# Patient Record
Sex: MALE | Race: WHITE | NOT HISPANIC OR LATINO | URBAN - METROPOLITAN AREA
[De-identification: names, ages, dates, MRNs, and addresses within clinical notes are randomized per-mention and may not be internally consistent; named-entity substitution may affect disease eponyms.]

---

## 2020-11-30 ENCOUNTER — INPATIENT (INPATIENT)
Facility: HOSPITAL | Age: 50
LOS: 4 days | Discharge: HOME | End: 2020-12-05
Payer: COMMERCIAL

## 2020-11-30 VITALS
DIASTOLIC BLOOD PRESSURE: 69 MMHG | HEIGHT: 68 IN | RESPIRATION RATE: 20 BRPM | OXYGEN SATURATION: 95 % | TEMPERATURE: 103 F | WEIGHT: 259.93 LBS | SYSTOLIC BLOOD PRESSURE: 130 MMHG

## 2020-11-30 DIAGNOSIS — Z98.890 OTHER SPECIFIED POSTPROCEDURAL STATES: Chronic | ICD-10-CM

## 2020-11-30 DIAGNOSIS — E11.9 TYPE 2 DIABETES MELLITUS WITHOUT COMPLICATIONS: ICD-10-CM

## 2020-11-30 DIAGNOSIS — N39.0 URINARY TRACT INFECTION, SITE NOT SPECIFIED: ICD-10-CM

## 2020-11-30 DIAGNOSIS — I10 ESSENTIAL (PRIMARY) HYPERTENSION: ICD-10-CM

## 2020-11-30 LAB
ALBUMIN SERPL ELPH-MCNC: 4.6 G/DL — SIGNIFICANT CHANGE UP (ref 3.5–5.2)
ALP SERPL-CCNC: 74 U/L — SIGNIFICANT CHANGE UP (ref 30–115)
ALT FLD-CCNC: 46 U/L — HIGH (ref 0–41)
ANION GAP SERPL CALC-SCNC: 16 MMOL/L — HIGH (ref 7–14)
APPEARANCE UR: CLEAR — SIGNIFICANT CHANGE UP
APTT BLD: 31.7 SEC — SIGNIFICANT CHANGE UP (ref 27–39.2)
AST SERPL-CCNC: 24 U/L — SIGNIFICANT CHANGE UP (ref 0–41)
BACTERIA # UR AUTO: ABNORMAL
BASE EXCESS BLDV CALC-SCNC: -2 MMOL/L — SIGNIFICANT CHANGE UP (ref -2–2)
BASOPHILS # BLD AUTO: 0.04 K/UL — SIGNIFICANT CHANGE UP (ref 0–0.2)
BASOPHILS NFR BLD AUTO: 0.2 % — SIGNIFICANT CHANGE UP (ref 0–1)
BILIRUB SERPL-MCNC: 1.7 MG/DL — HIGH (ref 0.2–1.2)
BILIRUB UR-MCNC: ABNORMAL
BUN SERPL-MCNC: 21 MG/DL — HIGH (ref 10–20)
CA-I SERPL-SCNC: 1.22 MMOL/L — SIGNIFICANT CHANGE UP (ref 1.12–1.3)
CALCIUM SERPL-MCNC: 9.5 MG/DL — SIGNIFICANT CHANGE UP (ref 8.5–10.1)
CHLORIDE SERPL-SCNC: 100 MMOL/L — SIGNIFICANT CHANGE UP (ref 98–110)
CO2 SERPL-SCNC: 19 MMOL/L — SIGNIFICANT CHANGE UP (ref 17–32)
COLOR SPEC: YELLOW — SIGNIFICANT CHANGE UP
CREAT SERPL-MCNC: 1.3 MG/DL — SIGNIFICANT CHANGE UP (ref 0.7–1.5)
DIFF PNL FLD: ABNORMAL
EOSINOPHIL # BLD AUTO: 0 K/UL — SIGNIFICANT CHANGE UP (ref 0–0.7)
EOSINOPHIL NFR BLD AUTO: 0 % — SIGNIFICANT CHANGE UP (ref 0–8)
EPI CELLS # UR: ABNORMAL /HPF
GAS PNL BLDV: 139 MMOL/L — SIGNIFICANT CHANGE UP (ref 136–145)
GAS PNL BLDV: SIGNIFICANT CHANGE UP
GLUCOSE BLDC GLUCOMTR-MCNC: 221 MG/DL — HIGH (ref 70–99)
GLUCOSE SERPL-MCNC: 185 MG/DL — HIGH (ref 70–99)
GLUCOSE UR QL: NEGATIVE MG/DL — SIGNIFICANT CHANGE UP
HCO3 BLDV-SCNC: 22 MMOL/L — SIGNIFICANT CHANGE UP (ref 22–29)
HCT VFR BLD CALC: 41.2 % — LOW (ref 42–52)
HCT VFR BLDA CALC: 37.9 % — SIGNIFICANT CHANGE UP (ref 34–44)
HGB BLD CALC-MCNC: 12.4 G/DL — LOW (ref 14–18)
HGB BLD-MCNC: 14.4 G/DL — SIGNIFICANT CHANGE UP (ref 14–18)
HOROWITZ INDEX BLDV+IHG-RTO: 21 — SIGNIFICANT CHANGE UP
IMM GRANULOCYTES NFR BLD AUTO: 0.8 % — HIGH (ref 0.1–0.3)
INR BLD: 1.34 RATIO — HIGH (ref 0.65–1.3)
KETONES UR-MCNC: NEGATIVE — SIGNIFICANT CHANGE UP
LACTATE BLDV-MCNC: 2 MMOL/L — HIGH (ref 0.5–1.6)
LEUKOCYTE ESTERASE UR-ACNC: ABNORMAL
LYMPHOCYTES # BLD AUTO: 0.38 K/UL — LOW (ref 1.2–3.4)
LYMPHOCYTES # BLD AUTO: 1.7 % — LOW (ref 20.5–51.1)
MANUAL SMEAR VERIFICATION: SIGNIFICANT CHANGE UP
MCHC RBC-ENTMCNC: 30.8 PG — SIGNIFICANT CHANGE UP (ref 27–31)
MCHC RBC-ENTMCNC: 35 G/DL — SIGNIFICANT CHANGE UP (ref 32–37)
MCV RBC AUTO: 88 FL — SIGNIFICANT CHANGE UP (ref 80–94)
MONOCYTES # BLD AUTO: 1.5 K/UL — HIGH (ref 0.1–0.6)
MONOCYTES NFR BLD AUTO: 6.6 % — SIGNIFICANT CHANGE UP (ref 1.7–9.3)
NEUTROPHILS # BLD AUTO: 20.78 K/UL — HIGH (ref 1.4–6.5)
NEUTROPHILS NFR BLD AUTO: 90.7 % — HIGH (ref 42.2–75.2)
NEUTS BAND # BLD: 2 % — SIGNIFICANT CHANGE UP (ref 0–6)
NITRITE UR-MCNC: NEGATIVE — SIGNIFICANT CHANGE UP
NRBC # BLD: 0 /100 WBCS — SIGNIFICANT CHANGE UP (ref 0–0)
NRBC # BLD: 0 /100 — SIGNIFICANT CHANGE UP (ref 0–0)
PCO2 BLDV: 34 MMHG — LOW (ref 41–51)
PH BLDV: 7.42 — SIGNIFICANT CHANGE UP (ref 7.26–7.43)
PH UR: 6 — SIGNIFICANT CHANGE UP (ref 5–8)
PLAT MORPH BLD: NORMAL — SIGNIFICANT CHANGE UP
PLATELET # BLD AUTO: 150 K/UL — SIGNIFICANT CHANGE UP (ref 130–400)
PO2 BLDV: 40 MMHG — SIGNIFICANT CHANGE UP (ref 20–40)
POTASSIUM BLDV-SCNC: 3.7 MMOL/L — SIGNIFICANT CHANGE UP (ref 3.3–5.6)
POTASSIUM SERPL-MCNC: 4 MMOL/L — SIGNIFICANT CHANGE UP (ref 3.5–5)
POTASSIUM SERPL-SCNC: 4 MMOL/L — SIGNIFICANT CHANGE UP (ref 3.5–5)
PROT SERPL-MCNC: 7.7 G/DL — SIGNIFICANT CHANGE UP (ref 6–8)
PROT UR-MCNC: 100 MG/DL
PROTHROM AB SERPL-ACNC: 15.4 SEC — HIGH (ref 9.95–12.87)
RAPID RVP RESULT: SIGNIFICANT CHANGE UP
RBC # BLD: 4.68 M/UL — LOW (ref 4.7–6.1)
RBC # FLD: 12.1 % — SIGNIFICANT CHANGE UP (ref 11.5–14.5)
RBC BLD AUTO: NORMAL — SIGNIFICANT CHANGE UP
RBC CASTS # UR COMP ASSIST: ABNORMAL /HPF
SAO2 % BLDV: 78 % — SIGNIFICANT CHANGE UP
SARS-COV-2 RNA SPEC QL NAA+PROBE: SIGNIFICANT CHANGE UP
SODIUM SERPL-SCNC: 135 MMOL/L — SIGNIFICANT CHANGE UP (ref 135–146)
SP GR SPEC: 1.02 — SIGNIFICANT CHANGE UP (ref 1.01–1.03)
UROBILINOGEN FLD QL: 0.2 MG/DL — SIGNIFICANT CHANGE UP (ref 0.2–0.2)
WBC # BLD: 22.89 K/UL — HIGH (ref 4.8–10.8)
WBC # FLD AUTO: 22.89 K/UL — HIGH (ref 4.8–10.8)
WBC UR QL: >50 /HPF

## 2020-11-30 PROCEDURE — 99285 EMERGENCY DEPT VISIT HI MDM: CPT

## 2020-11-30 PROCEDURE — 71045 X-RAY EXAM CHEST 1 VIEW: CPT | Mod: 26

## 2020-11-30 RX ORDER — SODIUM CHLORIDE 9 MG/ML
3700 INJECTION, SOLUTION INTRAVENOUS ONCE
Refills: 0 | Status: COMPLETED | OUTPATIENT
Start: 2020-11-30 | End: 2020-11-30

## 2020-11-30 RX ORDER — IBUPROFEN 200 MG
400 TABLET ORAL EVERY 8 HOURS
Refills: 0 | Status: DISCONTINUED | OUTPATIENT
Start: 2020-11-30 | End: 2020-12-05

## 2020-11-30 RX ORDER — ENOXAPARIN SODIUM 100 MG/ML
40 INJECTION SUBCUTANEOUS DAILY
Refills: 0 | Status: DISCONTINUED | OUTPATIENT
Start: 2020-11-30 | End: 2020-12-05

## 2020-11-30 RX ORDER — INFLUENZA VIRUS VACCINE 15; 15; 15; 15 UG/.5ML; UG/.5ML; UG/.5ML; UG/.5ML
0.5 SUSPENSION INTRAMUSCULAR ONCE
Refills: 0 | Status: COMPLETED | OUTPATIENT
Start: 2020-11-30 | End: 2020-12-05

## 2020-11-30 RX ORDER — ACETAMINOPHEN 500 MG
975 TABLET ORAL ONCE
Refills: 0 | Status: COMPLETED | OUTPATIENT
Start: 2020-11-30 | End: 2020-11-30

## 2020-11-30 RX ORDER — CEFTRIAXONE 500 MG/1
1000 INJECTION, POWDER, FOR SOLUTION INTRAMUSCULAR; INTRAVENOUS ONCE
Refills: 0 | Status: COMPLETED | OUTPATIENT
Start: 2020-11-30 | End: 2020-11-30

## 2020-11-30 RX ORDER — ACETAMINOPHEN 500 MG
650 TABLET ORAL EVERY 6 HOURS
Refills: 0 | Status: DISCONTINUED | OUTPATIENT
Start: 2020-11-30 | End: 2020-12-05

## 2020-11-30 RX ORDER — METFORMIN HYDROCHLORIDE 850 MG/1
500 TABLET ORAL DAILY
Refills: 0 | Status: DISCONTINUED | OUTPATIENT
Start: 2020-11-30 | End: 2020-11-30

## 2020-11-30 RX ORDER — LOSARTAN POTASSIUM 100 MG/1
0 TABLET, FILM COATED ORAL
Qty: 0 | Refills: 0 | DISCHARGE

## 2020-11-30 RX ORDER — METFORMIN HYDROCHLORIDE 850 MG/1
1 TABLET ORAL
Qty: 0 | Refills: 0 | DISCHARGE

## 2020-11-30 RX ORDER — LOSARTAN POTASSIUM 100 MG/1
100 TABLET, FILM COATED ORAL DAILY
Refills: 0 | Status: DISCONTINUED | OUTPATIENT
Start: 2020-11-30 | End: 2020-12-05

## 2020-11-30 RX ORDER — SODIUM CHLORIDE 9 MG/ML
1000 INJECTION INTRAMUSCULAR; INTRAVENOUS; SUBCUTANEOUS ONCE
Refills: 0 | Status: COMPLETED | OUTPATIENT
Start: 2020-11-30 | End: 2020-11-30

## 2020-11-30 RX ORDER — CEFEPIME 1 G/1
500 INJECTION, POWDER, FOR SOLUTION INTRAMUSCULAR; INTRAVENOUS EVERY 12 HOURS
Refills: 0 | Status: DISCONTINUED | OUTPATIENT
Start: 2020-11-30 | End: 2020-12-01

## 2020-11-30 RX ADMIN — SODIUM CHLORIDE 3700 MILLILITER(S): 9 INJECTION, SOLUTION INTRAVENOUS at 11:23

## 2020-11-30 RX ADMIN — Medication 650 MILLIGRAM(S): at 17:50

## 2020-11-30 RX ADMIN — Medication 650 MILLIGRAM(S): at 16:17

## 2020-11-30 RX ADMIN — Medication 650 MILLIGRAM(S): at 22:33

## 2020-11-30 RX ADMIN — Medication 975 MILLIGRAM(S): at 11:22

## 2020-11-30 RX ADMIN — SODIUM CHLORIDE 1000 MILLILITER(S): 9 INJECTION INTRAMUSCULAR; INTRAVENOUS; SUBCUTANEOUS at 18:14

## 2020-11-30 RX ADMIN — CEFTRIAXONE 100 MILLIGRAM(S): 500 INJECTION, POWDER, FOR SOLUTION INTRAMUSCULAR; INTRAVENOUS at 12:25

## 2020-11-30 RX ADMIN — Medication 400 MILLIGRAM(S): at 18:33

## 2020-11-30 RX ADMIN — Medication 400 MILLIGRAM(S): at 19:56

## 2020-11-30 RX ADMIN — CEFEPIME 100 MILLIGRAM(S): 1 INJECTION, POWDER, FOR SOLUTION INTRAMUSCULAR; INTRAVENOUS at 17:49

## 2020-11-30 NOTE — H&P ADULT - NSHPREVIEWOFSYSTEMS_GEN_ALL_CORE
REVIEW OF SYSTEMS:    CONSTITUTIONAL: see HPI  EYES/ENT: No visual changes;  No vertigo or throat pain   NECK: No pain or stiffness  RESPIRATORY: No cough, wheezing, hemoptysis; No shortness of breath  CARDIOVASCULAR: No chest pain or palpitations  GASTROINTESTINAL: No abdominal or epigastric pain. No nausea, vomiting, or hematemesis; No diarrhea or constipation. No melena or hematochezia.  GENITOURINARY: see HPI  NEUROLOGICAL: No numbness or weakness  SKIN: No itching, rashes

## 2020-11-30 NOTE — ED PROVIDER NOTE - PROGRESS NOTE DETAILS
AG: d/w urologist Dr Maurice [phone] who suggests additional w/u incl covid as pt has been on bactrim, neg CT 2wks ago.

## 2020-11-30 NOTE — ED PROVIDER NOTE - OBJECTIVE STATEMENT
50yM pmhx HTN, pre-diabetes c/o fever tmax 101 x1 day; constant, waxing and waning w/ tylenol; associated w/ 2wks of urinary frequency, cloudy urine, and suprapubic abd pain, took 1 course of bactrim at start of sx and has taken 3 doses of a second course, both rx'ed by pt's urologist Dr Maurice who he sees because of frequent UTIs; had -CT abd/pel 2wks ago. States he had mid-back discomfort a few days ago, denies dysuria.

## 2020-11-30 NOTE — ED ADULT TRIAGE NOTE - CHIEF COMPLAINT QUOTE
Pt states he has been running a fever since last night.  States he is on batrium x 2 weeks. Pt also stopped taking Metformin a week ago

## 2020-11-30 NOTE — ED PROVIDER NOTE - PHYSICAL EXAMINATION
Vital Signs: Reviewed  GEN: alert, NAD, speaks full sentences, answers questions appropriately  HEAD:  normocephalic, atraumatic  EYES:  PERRLA; conjunctivae without injection, drainage or discharge  ENMT:  nasal mucosa moist; mouth moist without ulcerations or lesions; throat moist without erythema, exudate, ulcerations or lesions  NECK:  supple  CARDIAC:  regular rate, normal S1 and S2, no murmurs  RESP:  respiratory rate and effort appear normal for age; lungs are clear to auscultation bilaterally; no rales or wheezes  ABDOMEN:  mild suprapubic tenderness no guarding no rebound; soft, nondistended  : no penile discharge, no CVA tenderness  MUSCULOSKELETAL/NEURO:  normal movement, normal tone  SKIN:  normal skin color for age and race, well-perfused; warm and dry

## 2020-11-30 NOTE — H&P ADULT - NSHPLABSRESULTS_GEN_ALL_CORE
14.4   22.89 )-----------( 150      ( 2020 11:12 )             41.2           135  |  100  |  21<H>  ----------------------------<  185<H>  4.0   |  19  |  1.3    Ca    9.5      2020 11:12    TPro  7.7  /  Alb  4.6  /  TBili  1.7<H>  /  DBili  x   /  AST  24  /  ALT  46<H>  /  AlkPhos  74                Urinalysis Basic - ( 2020 11:52 )    Color: Yellow / Appearance: Clear / S.025 / pH: x  Gluc: x / Ketone: Negative  / Bili: Small / Urobili: 0.2 mg/dL   Blood: x / Protein: 100 mg/dL / Nitrite: Negative   Leuk Esterase: Moderate / RBC: 6-10 /HPF / WBC >50 /HPF   Sq Epi: x / Non Sq Epi: Occasional /HPF / Bacteria: Many        PT/INR - ( 2020 11:12 )   PT: 15.40 sec;   INR: 1.34 ratio         PTT - ( 2020 11:12 )  PTT:31.7 sec    Lactate Trend            < from: Xray Chest 1 View-PORTABLE IMMEDIATE (20 @ 11:10) >    Impression:    No radiographic evidence of acute cardiopulmonary disease.        PARAM LEUNG MD; Attending Radiologist  This document has been electronically signed. 2020 11:14AM

## 2020-11-30 NOTE — H&P ADULT - NSHPPHYSICALEXAM_GEN_ALL_CORE
GENERAL:  51y/o Male NAD, resting comfortably.  HEAD:  Atraumatic, Normocephalic  EYES: EOMI, PERRLA, conjunctiva and sclera clear  NECK: Supple, No JVD, no cervical lymphadenopathy, non-tender  CHEST/LUNG: Clear to auscultation bilaterally; No wheeze, rhonchi, or rales  HEART: Regular rate and rhythm; S1&S2  ABDOMEN: Soft, Nontender, Nondistended x 4 quadrants; Bowel sounds present  EXTREMITIES:   Peripheral Pulses Present, No clubbing, no cyanosis, or no edema, no calf tenderness  PSYCH: AAOx3, cooperative, appropriate  NEUROLOGY: WNL  SKIN: WNL

## 2020-11-30 NOTE — ED PROVIDER NOTE - CLINICAL SUMMARY MEDICAL DECISION MAKING FREE TEXT BOX
needs IV abx, IV fluid.  In my opinion, in patient treatment is medically justifiable and appropriate.

## 2020-11-30 NOTE — ED PROVIDER NOTE - NS ED ROS FT
Review of Systems:  	•	CONSTITUTIONAL - no fever, no diaphoresis  	•	SKIN - no rash, no lesions  	•	HEMATOLOGIC - no bleeding, no bruising  	•	EYES - no discharge, no injection  	•	ENT - no sore throat, no runny nose  	•	RESPIRATORY - no shortness of breath, no cough  	•	CARDIAC - no chest pain, no palpitations  	•	GI - +abd pain, no nausea, no vomiting, no diarrhea  	•	GENITO-URINARY - no dysuria, no hematuria, +urinary frequency, +cloudy urine  	•	MUSCULOSKELETAL - no joint pain, no muscle aches  	•	NEUROLOGIC - no dizziness, no headache

## 2020-12-01 LAB
ANION GAP SERPL CALC-SCNC: 12 MMOL/L — SIGNIFICANT CHANGE UP (ref 7–14)
BASOPHILS # BLD AUTO: 0.03 K/UL — SIGNIFICANT CHANGE UP (ref 0–0.2)
BASOPHILS NFR BLD AUTO: 0.1 % — SIGNIFICANT CHANGE UP (ref 0–1)
BUN SERPL-MCNC: 13 MG/DL — SIGNIFICANT CHANGE UP (ref 10–20)
CALCIUM SERPL-MCNC: 9.2 MG/DL — SIGNIFICANT CHANGE UP (ref 8.5–10.1)
CHLORIDE SERPL-SCNC: 103 MMOL/L — SIGNIFICANT CHANGE UP (ref 98–110)
CO2 SERPL-SCNC: 22 MMOL/L — SIGNIFICANT CHANGE UP (ref 17–32)
CREAT SERPL-MCNC: 1 MG/DL — SIGNIFICANT CHANGE UP (ref 0.7–1.5)
EOSINOPHIL # BLD AUTO: 0 K/UL — SIGNIFICANT CHANGE UP (ref 0–0.7)
EOSINOPHIL NFR BLD AUTO: 0 % — SIGNIFICANT CHANGE UP (ref 0–8)
GLUCOSE BLDC GLUCOMTR-MCNC: 130 MG/DL — HIGH (ref 70–99)
GLUCOSE BLDC GLUCOMTR-MCNC: 149 MG/DL — HIGH (ref 70–99)
GLUCOSE BLDC GLUCOMTR-MCNC: 156 MG/DL — HIGH (ref 70–99)
GLUCOSE BLDC GLUCOMTR-MCNC: 157 MG/DL — HIGH (ref 70–99)
GLUCOSE BLDC GLUCOMTR-MCNC: 182 MG/DL — HIGH (ref 70–99)
GLUCOSE SERPL-MCNC: 156 MG/DL — HIGH (ref 70–99)
HCT VFR BLD CALC: 37.2 % — LOW (ref 42–52)
HGB BLD-MCNC: 12.7 G/DL — LOW (ref 14–18)
IMM GRANULOCYTES NFR BLD AUTO: 1.2 % — HIGH (ref 0.1–0.3)
LYMPHOCYTES # BLD AUTO: 0.73 K/UL — LOW (ref 1.2–3.4)
LYMPHOCYTES # BLD AUTO: 3.6 % — LOW (ref 20.5–51.1)
MCHC RBC-ENTMCNC: 30.5 PG — SIGNIFICANT CHANGE UP (ref 27–31)
MCHC RBC-ENTMCNC: 34.1 G/DL — SIGNIFICANT CHANGE UP (ref 32–37)
MCV RBC AUTO: 89.4 FL — SIGNIFICANT CHANGE UP (ref 80–94)
MONOCYTES # BLD AUTO: 1.68 K/UL — HIGH (ref 0.1–0.6)
MONOCYTES NFR BLD AUTO: 8.3 % — SIGNIFICANT CHANGE UP (ref 1.7–9.3)
NEUTROPHILS # BLD AUTO: 17.55 K/UL — HIGH (ref 1.4–6.5)
NEUTROPHILS NFR BLD AUTO: 86.8 % — HIGH (ref 42.2–75.2)
NRBC # BLD: 0 /100 WBCS — SIGNIFICANT CHANGE UP (ref 0–0)
PLATELET # BLD AUTO: 129 K/UL — LOW (ref 130–400)
POTASSIUM SERPL-MCNC: 4.5 MMOL/L — SIGNIFICANT CHANGE UP (ref 3.5–5)
POTASSIUM SERPL-SCNC: 4.5 MMOL/L — SIGNIFICANT CHANGE UP (ref 3.5–5)
RBC # BLD: 4.16 M/UL — LOW (ref 4.7–6.1)
RBC # FLD: 12.6 % — SIGNIFICANT CHANGE UP (ref 11.5–14.5)
SODIUM SERPL-SCNC: 137 MMOL/L — SIGNIFICANT CHANGE UP (ref 135–146)
WBC # BLD: 20.24 K/UL — HIGH (ref 4.8–10.8)
WBC # FLD AUTO: 20.24 K/UL — HIGH (ref 4.8–10.8)

## 2020-12-01 PROCEDURE — 74176 CT ABD & PELVIS W/O CONTRAST: CPT | Mod: 26

## 2020-12-01 RX ORDER — MEROPENEM 1 G/30ML
1000 INJECTION INTRAVENOUS EVERY 8 HOURS
Refills: 0 | Status: DISCONTINUED | OUTPATIENT
Start: 2020-12-01 | End: 2020-12-04

## 2020-12-01 RX ORDER — KETOROLAC TROMETHAMINE 30 MG/ML
30 SYRINGE (ML) INJECTION ONCE
Refills: 0 | Status: DISCONTINUED | OUTPATIENT
Start: 2020-12-01 | End: 2020-12-01

## 2020-12-01 RX ORDER — MEROPENEM 1 G/30ML
1000 INJECTION INTRAVENOUS ONCE
Refills: 0 | Status: COMPLETED | OUTPATIENT
Start: 2020-12-01 | End: 2020-12-01

## 2020-12-01 RX ORDER — SODIUM CHLORIDE 9 MG/ML
500 INJECTION INTRAMUSCULAR; INTRAVENOUS; SUBCUTANEOUS ONCE
Refills: 0 | Status: COMPLETED | OUTPATIENT
Start: 2020-12-01 | End: 2020-12-01

## 2020-12-01 RX ORDER — PANTOPRAZOLE SODIUM 20 MG/1
40 TABLET, DELAYED RELEASE ORAL
Refills: 0 | Status: DISCONTINUED | OUTPATIENT
Start: 2020-12-01 | End: 2020-12-05

## 2020-12-01 RX ORDER — SODIUM CHLORIDE 9 MG/ML
1000 INJECTION, SOLUTION INTRAVENOUS
Refills: 0 | Status: DISCONTINUED | OUTPATIENT
Start: 2020-12-01 | End: 2020-12-02

## 2020-12-01 RX ORDER — MEROPENEM 1 G/30ML
INJECTION INTRAVENOUS
Refills: 0 | Status: DISCONTINUED | OUTPATIENT
Start: 2020-12-01 | End: 2020-12-04

## 2020-12-01 RX ORDER — CEFEPIME 1 G/1
1000 INJECTION, POWDER, FOR SOLUTION INTRAMUSCULAR; INTRAVENOUS EVERY 8 HOURS
Refills: 0 | Status: DISCONTINUED | OUTPATIENT
Start: 2020-12-01 | End: 2020-12-01

## 2020-12-01 RX ADMIN — Medication 650 MILLIGRAM(S): at 01:33

## 2020-12-01 RX ADMIN — Medication 30 MILLIGRAM(S): at 23:55

## 2020-12-01 RX ADMIN — MEROPENEM 100 MILLIGRAM(S): 1 INJECTION INTRAVENOUS at 17:06

## 2020-12-01 RX ADMIN — Medication 400 MILLIGRAM(S): at 04:28

## 2020-12-01 RX ADMIN — CEFEPIME 100 MILLIGRAM(S): 1 INJECTION, POWDER, FOR SOLUTION INTRAMUSCULAR; INTRAVENOUS at 13:12

## 2020-12-01 RX ADMIN — Medication 400 MILLIGRAM(S): at 12:28

## 2020-12-01 RX ADMIN — Medication 400 MILLIGRAM(S): at 10:57

## 2020-12-01 RX ADMIN — Medication 650 MILLIGRAM(S): at 04:45

## 2020-12-01 RX ADMIN — Medication 30 MILLIGRAM(S): at 17:07

## 2020-12-01 RX ADMIN — Medication 650 MILLIGRAM(S): at 05:17

## 2020-12-01 RX ADMIN — MEROPENEM 100 MILLIGRAM(S): 1 INJECTION INTRAVENOUS at 21:21

## 2020-12-01 RX ADMIN — Medication 30 MILLIGRAM(S): at 23:40

## 2020-12-01 RX ADMIN — LOSARTAN POTASSIUM 100 MILLIGRAM(S): 100 TABLET, FILM COATED ORAL at 05:23

## 2020-12-01 RX ADMIN — Medication 400 MILLIGRAM(S): at 02:33

## 2020-12-01 RX ADMIN — CEFEPIME 100 MILLIGRAM(S): 1 INJECTION, POWDER, FOR SOLUTION INTRAMUSCULAR; INTRAVENOUS at 05:23

## 2020-12-01 RX ADMIN — SODIUM CHLORIDE 500 MILLILITER(S): 9 INJECTION INTRAMUSCULAR; INTRAVENOUS; SUBCUTANEOUS at 18:12

## 2020-12-01 RX ADMIN — Medication 30 MILLIGRAM(S): at 16:24

## 2020-12-01 RX ADMIN — ENOXAPARIN SODIUM 40 MILLIGRAM(S): 100 INJECTION SUBCUTANEOUS at 11:07

## 2020-12-01 RX ADMIN — Medication 650 MILLIGRAM(S): at 17:51

## 2020-12-01 RX ADMIN — SODIUM CHLORIDE 500 MILLILITER(S): 9 INJECTION INTRAMUSCULAR; INTRAVENOUS; SUBCUTANEOUS at 15:55

## 2020-12-01 NOTE — CONSULT NOTE ADULT - ASSESSMENT
ASSESSMENT  49 y/o Male with a pmhx HTN, pre-diabetes who presents with fever and nausea      IMPRESSION  #Sepsis on admission (Fevers, Tachycardia, WBC) possibly from ascending UTI/Pyelonephritis  #Recurrent UTI - with hx of urethral dilatation procedure  #Obesity BMI (kg/m2): 51.5  #DM   #Abx allergy: NKDA    RECOMMENDATIONS  - please obtain CT Abd/Pelvis w contrast to evaluate for pyelo  - increase to cefepime 1g q 8 hours  - follow-up blood cultures  - follow-up urine cx    Please call or message on [employer] Teams if with any questions.  Spectra [phone]

## 2020-12-01 NOTE — CONSULT NOTE ADULT - SUBJECTIVE AND OBJECTIVE BOX
TAYO FONSECA  50y, Male  Allergy: No Known Drug Allergies  shellfish (Anaphylaxis)      CHIEF COMPLAINT: Fever / Chills (2020 14:48)      LOS  1d    HPI:  51 y/o Male with a pmhx HTN, pre-diabetes c/o fever tmax 101 x1 day; constant, waxing and waning; Reports 2wks of urinary frequency, cloudy urine, and suprapubic abd pain, took 1 course of bactrim at start of sx and has taken 3 doses of a second course, both rx'ed by pt's urologist Dr Maurice who he sees because of frequent UTIs; had -CT abd/pel 2wks ago no reported issues, and patient states had dilation of urethra done in past which urology believes is the source and cause of repeated infections. States he had mid-back discomfort a few days ago, denies dysuria.  Patient denies chest pain, denies dyspnea, denies any other medical complaints. (2020 14:48)      INFECTIOUS DISEASE HISTORY:  History as above.   Recently treated for UTI with symptoms of urinary frequency, dysuria, suprapubic pain for which he took a course of bactrim.   Symptoms resolved initially, but now recently with fevers and episode of nausea 1 day prior to admission.   Has no shannon left CVA tednerness, but has noted some pain there.   Denies any UTI symptoms at present.   Febrile on admission with elevated WBC.   Denies any coughing, dyspnea, abdominal pain; Reports constipation; no pain with bowel movements.   Denies any headaches, sore throat, rhinorrhea.   Denies any new joint pains or rashes.   Reports hx of dilation of urethera done around 6-7 years ago as procedure to help prevent recurrent UTI  CT abd/Pelvis was done by urologist 2 weeks ago; seen on patient's phone application and with no acute intra-abdominal findings.     PAST MEDICAL & SURGICAL HISTORY:  Chronic UTI    Diabetes mellitus    Hypertension    H/O umbilical hernia repair    H/O inguinal hernia repair        FAMILY HISTORY  No pertinent family history in first degree relatives        SOCIAL HISTORY  Social History:  denies smoking hx  denies etoh hx (2020 14:48)        ROS  General: Denies rigors, nightsweats  HEENT: Denies headache, rhinorrhea, sore throat, eye pain  CV: Denies CP, palpitations  PULM: Denies wheezing, hemoptysis  GI: Denies hematemesis, hematochezia, melena  : Denies discharge, hematuria  MSK: Denies arthralgias, myalgias  SKIN: Denies rash, lesions  NEURO: Denies paresthesias, weakness  PSYCH: Denies depression, anxiety    VITALS:  T(F): 99.2, Max: 102.8 (20 @ 10:15)  HR: 112  BP: 115/59  RR: 18Vital Signs Last 24 Hrs  T(C): 37.3 (01 Dec 2020 09:00), Max: 39.3 (2020 10:15)  T(F): 99.2 (01 Dec 2020 09:00), Max: 102.8 (2020 10:15)  HR: 112 (01 Dec 2020 05:08) (105 - 113)  BP: 115/59 (01 Dec 2020 05:08) (108/58 - 137/78)  BP(mean): --  RR: 18 (01 Dec 2020 05:08) (18 - 20)  SpO2: 96% (01 Dec 2020 07:34) (95% - 97%)    PHYSICAL EXAM:  Gen: NAD, resting in bed  HEENT: Normocephalic, atraumatic  Neck: supple, no lymphadenopathy  CV: Regular rate & regular rhythm  Lungs: decreased BS at bases, no fremitus  Abdomen: Soft, BS present  Ext: Warm, well perfused  Neuro: non focal, awake  Skin: no rash, no erythema  Lines: no phlebitis    TESTS & MEASUREMENTS:                        12.7   20.24 )-----------( 129      ( 01 Dec 2020 06:48 )             37.2         137  |  103  |  13  ----------------------------<  156<H>  4.5   |  22  |  1.0    Ca    9.2      01 Dec 2020 06:48    TPro  7.7  /  Alb  4.6  /  TBili  1.7<H>  /  DBili  x   /  AST  24  /  ALT  46<H>  /  AlkPhos  74  -30    eGFR if Non African American: 87 mL/min/1.73M2 (20 @ 06:48)  eGFR if : 101 mL/min/1.73M2 (20 @ 06:48)  eGFR if Non African American: 64 mL/min/1.73M2 (20 @ 11:12)  eGFR if : 74 mL/min/1.73M2 (20 @ 11:12)    LIVER FUNCTIONS - ( 2020 11:12 )  Alb: 4.6 g/dL / Pro: 7.7 g/dL / ALK PHOS: 74 U/L / ALT: 46 U/L / AST: 24 U/L / GGT: x           Urinalysis Basic - ( 2020 11:52 )    Color: Yellow / Appearance: Clear / S.025 / pH: x  Gluc: x / Ketone: Negative  / Bili: Small / Urobili: 0.2 mg/dL   Blood: x / Protein: 100 mg/dL / Nitrite: Negative   Leuk Esterase: Moderate / RBC: 6-10 /HPF / WBC >50 /HPF   Sq Epi: x / Non Sq Epi: Occasional /HPF / Bacteria: Many          Blood Gas Venous - Lactate: 2.0 mmoL/L (20 @ 11:40)      INFECTIOUS DISEASES TESTING      RADIOLOGY & ADDITIONAL TESTS:  I have personally reviewed the last Chest xray  CXR      CT      CARDIOLOGY TESTING  12 Lead ECG:   Ventricular Rate 130 BPM    Atrial Rate 130 BPM    P-R Interval 130 ms    QRS Duration 90 ms    Q-T Interval 304 ms    QTC Calculation(Bazett) 447 ms    P Axis 77 degrees    R Axis -36 degrees    T Axis 34 degrees    Diagnosis Line Sinus tachycardia  Left axis deviation  Nonspecific ST and T wave abnormality  Abnormal ECG    Confirmed by NELIDA FLORES MD ([id number]) on 2020 11:21:07 AM (20 @ 10:55)      MEDICATIONS  cefepime   IVPB 1000 IV Intermittent every 8 hours  enoxaparin Injectable 40 SubCutaneous daily  influenza   Vaccine 0.5 IntraMuscular once  losartan 100 Oral daily      Weight  Weight (kg): 122.8 (20 @ 13:00)    ANTIBIOTICS:  cefepime   IVPB 1000 milliGRAM(s) IV Intermittent every 8 hours      ALLERGIES:  No Known Drug Allergies  shellfish (Anaphylaxis)

## 2020-12-01 NOTE — CHART NOTE - NSCHARTNOTEFT_GEN_A_CORE
Patient persistently tachycardic and febrile, with headache. Admitted with urosepsis    CT a/p showing ascending UTI   ml bolus x1  changed cefepime 500 IV q12 to meropenem 1g q8- one dose brandon given 5pm  toradol 30 mg IV x1 for fever and headache    after one hour patient still febrile and tachycardic, BP stable  tylenol 650 PO  another  ml bolus x1    will monitor  Dr. Watson aware

## 2020-12-02 LAB
-  AMIKACIN: SIGNIFICANT CHANGE UP
-  AMOXICILLIN/CLAVULANIC ACID: SIGNIFICANT CHANGE UP
-  AMPICILLIN/SULBACTAM: SIGNIFICANT CHANGE UP
-  AMPICILLIN: SIGNIFICANT CHANGE UP
-  AZTREONAM: SIGNIFICANT CHANGE UP
-  CEFAZOLIN: SIGNIFICANT CHANGE UP
-  CEFEPIME: SIGNIFICANT CHANGE UP
-  CEFOXITIN: SIGNIFICANT CHANGE UP
-  CEFTRIAXONE: SIGNIFICANT CHANGE UP
-  CIPROFLOXACIN: SIGNIFICANT CHANGE UP
-  ERTAPENEM: SIGNIFICANT CHANGE UP
-  GENTAMICIN: SIGNIFICANT CHANGE UP
-  IMIPENEM: SIGNIFICANT CHANGE UP
-  LEVOFLOXACIN: SIGNIFICANT CHANGE UP
-  MEROPENEM: SIGNIFICANT CHANGE UP
-  NITROFURANTOIN: SIGNIFICANT CHANGE UP
-  PIPERACILLIN/TAZOBACTAM: SIGNIFICANT CHANGE UP
-  TIGECYCLINE: SIGNIFICANT CHANGE UP
-  TOBRAMYCIN: SIGNIFICANT CHANGE UP
-  TRIMETHOPRIM/SULFAMETHOXAZOLE: SIGNIFICANT CHANGE UP
ALBUMIN SERPL ELPH-MCNC: 3.8 G/DL — SIGNIFICANT CHANGE UP (ref 3.5–5.2)
ALP SERPL-CCNC: 62 U/L — SIGNIFICANT CHANGE UP (ref 30–115)
ALT FLD-CCNC: 33 U/L — SIGNIFICANT CHANGE UP (ref 0–41)
ANION GAP SERPL CALC-SCNC: 13 MMOL/L — SIGNIFICANT CHANGE UP (ref 7–14)
AST SERPL-CCNC: 18 U/L — SIGNIFICANT CHANGE UP (ref 0–41)
BILIRUB SERPL-MCNC: 0.8 MG/DL — SIGNIFICANT CHANGE UP (ref 0.2–1.2)
BUN SERPL-MCNC: 15 MG/DL — SIGNIFICANT CHANGE UP (ref 10–20)
CALCIUM SERPL-MCNC: 8.7 MG/DL — SIGNIFICANT CHANGE UP (ref 8.5–10.1)
CHLORIDE SERPL-SCNC: 99 MMOL/L — SIGNIFICANT CHANGE UP (ref 98–110)
CO2 SERPL-SCNC: 21 MMOL/L — SIGNIFICANT CHANGE UP (ref 17–32)
CREAT SERPL-MCNC: 0.9 MG/DL — SIGNIFICANT CHANGE UP (ref 0.7–1.5)
CULTURE RESULTS: SIGNIFICANT CHANGE UP
GLUCOSE BLDC GLUCOMTR-MCNC: 134 MG/DL — HIGH (ref 70–99)
GLUCOSE BLDC GLUCOMTR-MCNC: 139 MG/DL — HIGH (ref 70–99)
GLUCOSE BLDC GLUCOMTR-MCNC: 141 MG/DL — HIGH (ref 70–99)
GLUCOSE BLDC GLUCOMTR-MCNC: 158 MG/DL — HIGH (ref 70–99)
GLUCOSE SERPL-MCNC: 160 MG/DL — HIGH (ref 70–99)
HCT VFR BLD CALC: 35.4 % — LOW (ref 42–52)
HGB BLD-MCNC: 12.2 G/DL — LOW (ref 14–18)
MCHC RBC-ENTMCNC: 31 PG — SIGNIFICANT CHANGE UP (ref 27–31)
MCHC RBC-ENTMCNC: 34.5 G/DL — SIGNIFICANT CHANGE UP (ref 32–37)
MCV RBC AUTO: 90.1 FL — SIGNIFICANT CHANGE UP (ref 80–94)
METHOD TYPE: SIGNIFICANT CHANGE UP
NRBC # BLD: 0 /100 WBCS — SIGNIFICANT CHANGE UP (ref 0–0)
ORGANISM # SPEC MICROSCOPIC CNT: SIGNIFICANT CHANGE UP
ORGANISM # SPEC MICROSCOPIC CNT: SIGNIFICANT CHANGE UP
PLATELET # BLD AUTO: 134 K/UL — SIGNIFICANT CHANGE UP (ref 130–400)
POTASSIUM SERPL-MCNC: 4.3 MMOL/L — SIGNIFICANT CHANGE UP (ref 3.5–5)
POTASSIUM SERPL-SCNC: 4.3 MMOL/L — SIGNIFICANT CHANGE UP (ref 3.5–5)
PROT SERPL-MCNC: 6.4 G/DL — SIGNIFICANT CHANGE UP (ref 6–8)
RBC # BLD: 3.93 M/UL — LOW (ref 4.7–6.1)
RBC # FLD: 12.3 % — SIGNIFICANT CHANGE UP (ref 11.5–14.5)
SARS-COV-2 IGG SERPL QL IA: NEGATIVE — SIGNIFICANT CHANGE UP
SARS-COV-2 IGM SERPL IA-ACNC: 0.25 INDEX — SIGNIFICANT CHANGE UP
SARS-COV-2 RNA SPEC QL NAA+PROBE: SIGNIFICANT CHANGE UP
SODIUM SERPL-SCNC: 133 MMOL/L — LOW (ref 135–146)
SPECIMEN SOURCE: SIGNIFICANT CHANGE UP
WBC # BLD: 10.71 K/UL — SIGNIFICANT CHANGE UP (ref 4.8–10.8)
WBC # FLD AUTO: 10.71 K/UL — SIGNIFICANT CHANGE UP (ref 4.8–10.8)

## 2020-12-02 RX ADMIN — ENOXAPARIN SODIUM 40 MILLIGRAM(S): 100 INJECTION SUBCUTANEOUS at 14:47

## 2020-12-02 RX ADMIN — MEROPENEM 100 MILLIGRAM(S): 1 INJECTION INTRAVENOUS at 06:21

## 2020-12-02 RX ADMIN — Medication 400 MILLIGRAM(S): at 06:01

## 2020-12-02 RX ADMIN — Medication 400 MILLIGRAM(S): at 17:16

## 2020-12-02 RX ADMIN — LOSARTAN POTASSIUM 100 MILLIGRAM(S): 100 TABLET, FILM COATED ORAL at 06:00

## 2020-12-02 RX ADMIN — MEROPENEM 100 MILLIGRAM(S): 1 INJECTION INTRAVENOUS at 14:46

## 2020-12-02 RX ADMIN — Medication 650 MILLIGRAM(S): at 23:30

## 2020-12-02 RX ADMIN — Medication 650 MILLIGRAM(S): at 21:17

## 2020-12-02 RX ADMIN — Medication 400 MILLIGRAM(S): at 06:42

## 2020-12-02 RX ADMIN — MEROPENEM 100 MILLIGRAM(S): 1 INJECTION INTRAVENOUS at 21:17

## 2020-12-02 RX ADMIN — PANTOPRAZOLE SODIUM 40 MILLIGRAM(S): 20 TABLET, DELAYED RELEASE ORAL at 06:01

## 2020-12-02 RX ADMIN — Medication 650 MILLIGRAM(S): at 14:47

## 2020-12-02 NOTE — PROGRESS NOTE ADULT - SUBJECTIVE AND OBJECTIVE BOX
Name: TAYO FONSECA  Age: 50y  Gender: Male    Pt was seen and examined.   c/o:  severe headache, no other complaints.  reurrent fevers also but around 101 he says.    Allergies:  No Known Drug Allergies  shellfish (Anaphylaxis)      PHYSICAL EXAM:    Vitals:  T(C): 38.3 (12-01-20 @ 21:31), Max: 38.4 (12-01-20 @ 17:43)  HR: 103 (12-01-20 @ 21:31) (103 - 112)  BP: 141/75 (12-01-20 @ 21:31) (115/59 - 141/75)  RR: 17 (12-01-20 @ 21:31) (16 - 18)  SpO2: 96% (12-01-20 @ 07:34) (96% - 96%)  Wt(kg): --Vital Signs Last 24 Hrs  T(C): 38.3 (01 Dec 2020 21:31), Max: 38.4 (01 Dec 2020 17:43)  T(F): 101 (01 Dec 2020 21:31), Max: 101.2 (01 Dec 2020 17:43)  HR: 103 (01 Dec 2020 21:31) (103 - 112)  BP: 141/75 (01 Dec 2020 21:31) (115/59 - 141/75)  BP(mean): --  RR: 17 (01 Dec 2020 21:31) (16 - 18)  SpO2: 96% (01 Dec 2020 07:34) (96% - 96%)      NECK: Supple, No JVD  CHEST/LUNG: CTA, B/L, No rales, rhonchi, wheezing, or rubs  HEART: S1,S2, N1 Regular rate and rhythm; No murmurs, rubs, or gallops  ABDOMEN: Soft, Nontender, Nondistended; Bowel sounds present  EXTREMITIES:  2+ Peripheral Pulses, No clubbing, cyanosis, or edema      LABS:                        12.7   20.24 )-----------( 129      ( 01 Dec 2020 06:48 )             37.2     12-01    137  |  103  |  13  ----------------------------<  156<H>  4.5   |  22  |  1.0    Ca    9.2      01 Dec 2020 06:48    TPro  7.7  /  Alb  4.6  /  TBili  1.7<H>  /  DBili  x   /  AST  24  /  ALT  46<H>  /  AlkPhos  74  11-30    LIVER FUNCTIONS - ( 30 Nov 2020 11:12 )  Alb: 4.6 g/dL / Pro: 7.7 g/dL / ALK PHOS: 74 U/L / ALT: 46 U/L / AST: 24 U/L / GGT: x                 MEDICATIONS  (STANDING):  enoxaparin Injectable 40 milliGRAM(s) SubCutaneous daily  influenza   Vaccine 0.5 milliLiter(s) IntraMuscular once  losartan 100 milliGRAM(s) Oral daily  meropenem  IVPB 1000 milliGRAM(s) IV Intermittent every 8 hours  meropenem  IVPB      pantoprazole    Tablet 40 milliGRAM(s) Oral before breakfast        RADIOLOGY & ADDITIONAL TESTS:    Imaging Personally Reviewed:  [ ] YES  [ ] NO    A/P:  Assessment and Plan:    Assessment:  · Assessment	  49 y/o male chronic UTI / ? drug resistant     Problem/Plan - 1:         Sepsis on admission 2/2 recurrent UTI's.  Problem: Chronic recurrent  UTI.  Plan: Admit inpatient medicine  IV abx changed to Meropenem this afternoon.  currently on cooling blanket as well.  clinically pt looks good and non toxic.     Problem/Plan - 2:  ·  Problem: Type 2 diabetes mellitus without complication, without long-term current use of insulin.  Plan: FS monitoring  start metformin again daily  diet control.      Problem/Plan - 3:  ·  Problem: Hypertension, unspecified type.  Plan: continue home medications  monitor v/s.      Name: TAYO FONSECA  Age: 50y  Gender: Male    Pt was seen and examined.   c/o:  severe headache, no other complaints.  reurrent fevers also but around 101 he says.    Allergies:  No Known Drug Allergies  shellfish (Anaphylaxis)      PHYSICAL EXAM:    Vitals:  T(C): 38.3 (12-01-20 @ 21:31), Max: 38.4 (12-01-20 @ 17:43)  HR: 103 (12-01-20 @ 21:31) (103 - 112)  BP: 141/75 (12-01-20 @ 21:31) (115/59 - 141/75)  RR: 17 (12-01-20 @ 21:31) (16 - 18)  SpO2: 96% (12-01-20 @ 07:34) (96% - 96%)  Wt(kg): --Vital Signs Last 24 Hrs  T(C): 38.3 (01 Dec 2020 21:31), Max: 38.4 (01 Dec 2020 17:43)  T(F): 101 (01 Dec 2020 21:31), Max: 101.2 (01 Dec 2020 17:43)  HR: 103 (01 Dec 2020 21:31) (103 - 112)  BP: 141/75 (01 Dec 2020 21:31) (115/59 - 141/75)  BP(mean): --  RR: 17 (01 Dec 2020 21:31) (16 - 18)  SpO2: 96% (01 Dec 2020 07:34) (96% - 96%)      NECK: Supple, No JVD  CHEST/LUNG: CTA, B/L, No rales, rhonchi, wheezing, or rubs  HEART: S1,S2, N1 Regular rate and rhythm; No murmurs, rubs, or gallops  ABDOMEN: Soft, Nontender, Nondistended; Bowel sounds present  EXTREMITIES:  2+ Peripheral Pulses, No clubbing, cyanosis, or edema      LABS:                        12.7   20.24 )-----------( 129      ( 01 Dec 2020 06:48 )             37.2     12-01    137  |  103  |  13  ----------------------------<  156<H>  4.5   |  22  |  1.0    Ca    9.2      01 Dec 2020 06:48    TPro  7.7  /  Alb  4.6  /  TBili  1.7<H>  /  DBili  x   /  AST  24  /  ALT  46<H>  /  AlkPhos  74  11-30    LIVER FUNCTIONS - ( 30 Nov 2020 11:12 )  Alb: 4.6 g/dL / Pro: 7.7 g/dL / ALK PHOS: 74 U/L / ALT: 46 U/L / AST: 24 U/L / GGT: x                 MEDICATIONS  (STANDING):  enoxaparin Injectable 40 milliGRAM(s) SubCutaneous daily  influenza   Vaccine 0.5 milliLiter(s) IntraMuscular once  losartan 100 milliGRAM(s) Oral daily  meropenem  IVPB 1000 milliGRAM(s) IV Intermittent every 8 hours  meropenem  IVPB      pantoprazole    Tablet 40 milliGRAM(s) Oral before breakfast        RADIOLOGY & ADDITIONAL TESTS:    Imaging Personally Reviewed:  [ ] YES  [ ] NO    A/P:  Assessment and Plan:    Assessment:  · Assessment	  49 y/o male chronic UTI / ? drug resistant     Problem/Plan - 1:         Sepsis on admission 2/2 recurrent UTI's.  Problem: Chronic recurrent  UTI.  Plan: Admit inpatient medicine  IV abx changed to Meropenem this afternoon.  currently on cooling blanket as well.  clinically pt looks good and non toxic.  awaiting culture results  HA likely 2/2 fever     Problem/Plan - 2:  ·  Problem: Type 2 diabetes mellitus without complication, without long-term current use of insulin.  Plan: FS monitoring  start metformin again daily  diet control.      Problem/Plan - 3:  ·  Problem: Hypertension, unspecified type.  Plan: continue home medications  monitor v/s.

## 2020-12-02 NOTE — CHART NOTE - NSCHARTNOTEFT_GEN_A_CORE
patient persistently febrile and tachycardic 2/2 urosepsis, now with increased headache and cough, despite tylenol, advil, toradol, cooling blanket, ice packs  on meropenem 1g IV q8  covid on admission negative  CT ab/pel revealing ascending UTI  prelin ucx growing e coli  blood cx ngt  ID following    plan:   continue antipyretics and cooling blanket  continue brandon  fu cultures  repeat COVID today in presence of persistent headache and cough    Dr Watson aware

## 2020-12-03 LAB
GLUCOSE BLDC GLUCOMTR-MCNC: 110 MG/DL — HIGH (ref 70–99)
GLUCOSE BLDC GLUCOMTR-MCNC: 123 MG/DL — HIGH (ref 70–99)
GLUCOSE BLDC GLUCOMTR-MCNC: 150 MG/DL — HIGH (ref 70–99)

## 2020-12-03 RX ADMIN — Medication 650 MILLIGRAM(S): at 05:15

## 2020-12-03 RX ADMIN — LOSARTAN POTASSIUM 100 MILLIGRAM(S): 100 TABLET, FILM COATED ORAL at 05:15

## 2020-12-03 RX ADMIN — PANTOPRAZOLE SODIUM 40 MILLIGRAM(S): 20 TABLET, DELAYED RELEASE ORAL at 06:06

## 2020-12-03 RX ADMIN — Medication 650 MILLIGRAM(S): at 06:09

## 2020-12-03 RX ADMIN — Medication 400 MILLIGRAM(S): at 16:48

## 2020-12-03 RX ADMIN — Medication 400 MILLIGRAM(S): at 06:55

## 2020-12-03 RX ADMIN — MEROPENEM 100 MILLIGRAM(S): 1 INJECTION INTRAVENOUS at 14:40

## 2020-12-03 RX ADMIN — MEROPENEM 100 MILLIGRAM(S): 1 INJECTION INTRAVENOUS at 05:15

## 2020-12-03 RX ADMIN — MEROPENEM 100 MILLIGRAM(S): 1 INJECTION INTRAVENOUS at 21:12

## 2020-12-03 RX ADMIN — ENOXAPARIN SODIUM 40 MILLIGRAM(S): 100 INJECTION SUBCUTANEOUS at 11:39

## 2020-12-03 RX ADMIN — Medication 400 MILLIGRAM(S): at 06:20

## 2020-12-03 NOTE — PROGRESS NOTE ADULT - SUBJECTIVE AND OBJECTIVE BOX
Patient is seen and examined at the bed side, is febrile.  The Urine  culture grew pansensitive E.coli.      REVIEW OF SYSTEMS: All other review systems are negative      ALLERGIES: No Known Drug Allergies  shellfish (Anaphylaxis)        Vital Signs Last 24 Hrs  T(C): 38.3 (03 Dec 2020 16:48), Max: 38.5 (03 Dec 2020 06:09)  T(F): 101 (03 Dec 2020 16:48), Max: 101.3 (03 Dec 2020 06:09)  HR: 85 (03 Dec 2020 13:50) (85 - 106)  BP: 131/81 (03 Dec 2020 13:50) (131/81 - 140/90)  BP(mean): --  RR: 16 (03 Dec 2020 13:50) (16 - 16)  SpO2: --      PHYSICAL EXAM:  GENERAL: Not in distress   CHEST/LUNG: not using  accessory muscles  HEART: s1 and s2 present  ABDOMEN:  Nontender and  Nondistended  EXTREMITIES: No pedal  edema  CNS: Awake and Alert      LABS:                        12.2   10.71 )-----------( 134      ( 02 Dec 2020 06:42 )             35.4       12-02    133<L>  |  99  |  15  ----------------------------<  160<H>  4.3   |  21  |  0.9    Ca    8.7      02 Dec 2020 06:42    TPro  6.4  /  Alb  3.8  /  TBili  0.8  /  DBili  x   /  AST  18  /  ALT  33  /  AlkPhos  62  12-02        CAPILLARY BLOOD GLUCOSE  POCT Blood Glucose.: 110 mg/dL (03 Dec 2020 16:31)  POCT Blood Glucose.: 123 mg/dL (03 Dec 2020 11:56)  POCT Blood Glucose.: 150 mg/dL (03 Dec 2020 07:34)  POCT Blood Glucose.: 141 mg/dL (02 Dec 2020 20:38)        MEDICATIONS  (STANDING):  enoxaparin Injectable 40 milliGRAM(s) SubCutaneous daily  influenza   Vaccine 0.5 milliLiter(s) IntraMuscular once  losartan 100 milliGRAM(s) Oral daily  meropenem  IVPB      meropenem  IVPB 1000 milliGRAM(s) IV Intermittent every 8 hours  pantoprazole    Tablet 40 milliGRAM(s) Oral before breakfast    MEDICATIONS  (PRN):  acetaminophen   Tablet .. 650 milliGRAM(s) Oral every 6 hours PRN Temp greater or equal to 38C (100.4F), Mild Pain (1 - 3)  ibuprofen  Tablet. 400 milliGRAM(s) Oral every 8 hours PRN Temp greater or equal to 38C (100.4F)      RADIOLOGY & ADDITIONAL TESTS:    < from: CT Abdomen and Pelvis No Cont (12.01.20 @ 12:49) >  Left ureter surrounding inflammation without a radiopaque obstructing calculus. Findings may be due to recently passed calculus versus ascending urinary tract infection. Correlate with urinalysis.    Mildly prominent nonspecific portacaval lymph node measuring 1.8 cm. Short term follow up is recommended.      < from: Xray Chest 1 View-PORTABLE IMMEDIATE (11.30.20 @ 11:10) >    No radiographic evidence of acute cardiopulmonary disease.        MICROBIOLOGY DATA:    COVID-19 PCR . (12.02.20 @ 07:00)   COVID-19 PCR: NotDetec:     Urine Microscopic-Add On (NC) (11.30.20 @ 11:52)   Red Blood Cell - Urine: 6-10 /HPF   White Blood Cell - Urine: >50 /HPF   Bacteria: Many   Epithelial Cells: Occasional /HPF     Culture - Urine (11.30.20 @ 11:52)   - Amikacin: S <=16   - Amoxicillin/Clavulanic Acid: S <=8/4   - Ampicillin: R >16 These ampicillin results predict results for amoxicillin   - Ampicillin/Sulbactam: R >16/8 Enterobacter, Citrobacter, and Serratia may develop resistance during prolonged therapy (3-4 days)   - Aztreonam: S <=4   - Cefazolin: S 16 ([email or web address]) For uncomplicated UTI with K. pneumoniae, E. coli, or P. mirablis: BILLY <=16 is sensitive and BILLY >=32 is resistant. This also predicts results for oral agents cefaclor, cefdinir, cefpodoxime, cefprozil, cefuroxime axetil, cephalexin and locarbef for uncomplicated UTI. Note that some isolates may be susceptible to these agents while testing resistant to cefazolin.   - Cefepime: S <=2   - Cefoxitin: S <=8   - Ceftriaxone: S <=1 Enterobacter, Citrobacter, and Serratia may develop resistance during prolonged therapy   - Ciprofloxacin: S <=0.25   - Ertapenem: S <=0.5   - Gentamicin: S <=2   - Imipenem: S <=1   - Levofloxacin: S <=0.5   - Meropenem: S <=1   - Nitrofurantoin: S <=32 Should not be used to treat pyelonephritis   - Piperacillin/Tazobactam: S <=8   - Tigecycline: S <=2   - Tobramycin: S <=2   - Trimethoprim/Sulfamethoxazole: R >2/38   Specimen Source: .Urine Clean Catch (Midstream)   Culture Results:   >100,000 CFU/ml Escherichia coli              Patient is seen and examined at the bed side, is febrile.  The Urine culture grew pansensitive E.coli.      REVIEW OF SYSTEMS: All other review systems are negative      ALLERGIES: No Known Drug Allergies  shellfish (Anaphylaxis)        Vital Signs Last 24 Hrs  T(C): 38.3 (03 Dec 2020 16:48), Max: 38.5 (03 Dec 2020 06:09)  T(F): 101 (03 Dec 2020 16:48), Max: 101.3 (03 Dec 2020 06:09)  HR: 85 (03 Dec 2020 13:50) (85 - 106)  BP: 131/81 (03 Dec 2020 13:50) (131/81 - 140/90)  BP(mean): --  RR: 16 (03 Dec 2020 13:50) (16 - 16)  SpO2: --      PHYSICAL EXAM:  GENERAL: Not in distress   CHEST/LUNG: not using  accessory muscles  HEART: s1 and s2 present  ABDOMEN:  Nontender and  Nondistended  EXTREMITIES: No pedal  edema  CNS: Awake and Alert      LABS:                        12.2   10.71 )-----------( 134      ( 02 Dec 2020 06:42 )             35.4       12-02    133<L>  |  99  |  15  ----------------------------<  160<H>  4.3   |  21  |  0.9    Ca    8.7      02 Dec 2020 06:42    TPro  6.4  /  Alb  3.8  /  TBili  0.8  /  DBili  x   /  AST  18  /  ALT  33  /  AlkPhos  62  12-02        CAPILLARY BLOOD GLUCOSE  POCT Blood Glucose.: 110 mg/dL (03 Dec 2020 16:31)  POCT Blood Glucose.: 123 mg/dL (03 Dec 2020 11:56)  POCT Blood Glucose.: 150 mg/dL (03 Dec 2020 07:34)  POCT Blood Glucose.: 141 mg/dL (02 Dec 2020 20:38)        MEDICATIONS  (STANDING):  enoxaparin Injectable 40 milliGRAM(s) SubCutaneous daily  influenza   Vaccine 0.5 milliLiter(s) IntraMuscular once  losartan 100 milliGRAM(s) Oral daily  meropenem  IVPB      meropenem  IVPB 1000 milliGRAM(s) IV Intermittent every 8 hours  pantoprazole    Tablet 40 milliGRAM(s) Oral before breakfast    MEDICATIONS  (PRN):  acetaminophen   Tablet .. 650 milliGRAM(s) Oral every 6 hours PRN Temp greater or equal to 38C (100.4F), Mild Pain (1 - 3)  ibuprofen  Tablet. 400 milliGRAM(s) Oral every 8 hours PRN Temp greater or equal to 38C (100.4F)      RADIOLOGY & ADDITIONAL TESTS:    < from: CT Abdomen and Pelvis No Cont (12.01.20 @ 12:49) >  Left ureter surrounding inflammation without a radiopaque obstructing calculus. Findings may be due to recently passed calculus versus ascending urinary tract infection. Correlate with urinalysis.    Mildly prominent nonspecific portacaval lymph node measuring 1.8 cm. Short term follow up is recommended.      < from: Xray Chest 1 View-PORTABLE IMMEDIATE (11.30.20 @ 11:10) >    No radiographic evidence of acute cardiopulmonary disease.        MICROBIOLOGY DATA:    COVID-19 PCR . (12.02.20 @ 07:00)   COVID-19 PCR: NotDetec:     Urine Microscopic-Add On (NC) (11.30.20 @ 11:52)   Red Blood Cell - Urine: 6-10 /HPF   White Blood Cell - Urine: >50 /HPF   Bacteria: Many   Epithelial Cells: Occasional /HPF     Culture - Urine (11.30.20 @ 11:52)   - Amikacin: S <=16   - Amoxicillin/Clavulanic Acid: S <=8/4   - Ampicillin: R >16 These ampicillin results predict results for amoxicillin   - Ampicillin/Sulbactam: R >16/8 Enterobacter, Citrobacter, and Serratia may develop resistance during prolonged therapy (3-4 days)   - Aztreonam: S <=4   - Cefazolin: S 16 ([email or web address]) For uncomplicated UTI with K. pneumoniae, E. coli, or P. mirablis: BILLY <=16 is sensitive and BILLY >=32 is resistant. This also predicts results for oral agents cefaclor, cefdinir, cefpodoxime, cefprozil, cefuroxime axetil, cephalexin and locarbef for uncomplicated UTI. Note that some isolates may be susceptible to these agents while testing resistant to cefazolin.   - Cefepime: S <=2   - Cefoxitin: S <=8   - Ceftriaxone: S <=1 Enterobacter, Citrobacter, and Serratia may develop resistance during prolonged therapy   - Ciprofloxacin: S <=0.25   - Ertapenem: S <=0.5   - Gentamicin: S <=2   - Imipenem: S <=1   - Levofloxacin: S <=0.5   - Meropenem: S <=1   - Nitrofurantoin: S <=32 Should not be used to treat pyelonephritis   - Piperacillin/Tazobactam: S <=8   - Tigecycline: S <=2   - Tobramycin: S <=2   - Trimethoprim/Sulfamethoxazole: R >2/38   Specimen Source: .Urine Clean Catch (Midstream)   Culture Results:   >100,000 CFU/ml Escherichia coli

## 2020-12-03 NOTE — PROGRESS NOTE ADULT - SUBJECTIVE AND OBJECTIVE BOX
Name: TAYO FONSECA  Age: 50y  Gender: Male    Pt was seen and examined.   c/o:  doing better, much better tonight he says.  HA has completely resolved and the flank pains are almost     Allergies:  No Known Drug Allergies  shellfish (Anaphylaxis)      PHYSICAL EXAM:    Vitals:  T(C): 36.8 (12-02-20 @ 23:37), Max: 38.2 (12-02-20 @ 21:14)  HR: 99 (12-02-20 @ 21:38) (88 - 107)  BP: 140/90 (12-02-20 @ 21:38) (120/68 - 140/90)  RR: 16 (12-02-20 @ 21:38) (16 - 16)  SpO2: 98% (12-02-20 @ 08:08) (98% - 98%)  Wt(kg): --Vital Signs Last 24 Hrs  T(C): 36.8 (02 Dec 2020 23:37), Max: 38.2 (02 Dec 2020 21:14)  T(F): 98.2 (02 Dec 2020 23:37), Max: 100.7 (02 Dec 2020 21:14)  HR: 99 (02 Dec 2020 21:38) (88 - 107)  BP: 140/90 (02 Dec 2020 21:38) (120/68 - 140/90)  BP(mean): --  RR: 16 (02 Dec 2020 21:38) (16 - 16)  SpO2: 98% (02 Dec 2020 08:08) (98% - 98%)      NECK: Supple, No JVD  CHEST/LUNG: CTA, B/L, No rales, rhonchi, wheezing, or rubs  HEART: S1,S2, N1 Regular rate and rhythm; No murmurs, rubs, or gallops  ABDOMEN: Soft, Nontender, Nondistended; Bowel sounds present  EXTREMITIES:  2+ Peripheral Pulses, No clubbing, cyanosis, or edema      LABS:                        12.2   10.71 )-----------( 134      ( 02 Dec 2020 06:42 )             35.4     12-02    133<L>  |  99  |  15  ----------------------------<  160<H>  4.3   |  21  |  0.9    Ca    8.7      02 Dec 2020 06:42    TPro  6.4  /  Alb  3.8  /  TBili  0.8  /  DBili  x   /  AST  18  /  ALT  33  /  AlkPhos  62  12-02    LIVER FUNCTIONS - ( 02 Dec 2020 06:42 )  Alb: 3.8 g/dL / Pro: 6.4 g/dL / ALK PHOS: 62 U/L / ALT: 33 U/L / AST: 18 U/L / GGT: x                 MEDICATIONS  (STANDING):  enoxaparin Injectable 40 milliGRAM(s) SubCutaneous daily  influenza   Vaccine 0.5 milliLiter(s) IntraMuscular once  losartan 100 milliGRAM(s) Oral daily  meropenem  IVPB 1000 milliGRAM(s) IV Intermittent every 8 hours  meropenem  IVPB      pantoprazole    Tablet 40 milliGRAM(s) Oral before breakfast        RADIOLOGY & ADDITIONAL TESTS:    Imaging Personally Reviewed:  [ ] YES  [ ] NO    A/P:  A/P:  Assessment and Plan:    Assessment:  · Assessment	  51 y/o male chronic UTI / ? drug resistant     Problem/Plan - 1:         Sepsis on admission 2/2 recurrent UTI's.  Problem: Chronic recurrent  UTI.  Plan: Admit inpatient medicine  IV abx Meropenem , awaiting cultures  clinically pt looks good and non toxic.  Covid swab was also performed and is pending     Problem/Plan - 2:  ·  Problem: Type 2 diabetes mellitus without complication, without long-term current use of insulin.  Plan: FS monitoring  start metformin again daily  diet control.      Problem/Plan - 3:  ·  Problem: Hypertension, unspecified type.  Plan: continue home medications  monitor v/s.

## 2020-12-03 NOTE — PROGRESS NOTE ADULT - ASSESSMENT
49 y/o Male with a pmhx HTN, pre-diabetes who presents with fever and nausea      IMPRESSION  #Sepsis on admission (Fevers, Tachycardia, WBC) possibly from ascending UTI/Pyelonephritis  #Recurrent UTI - with hx of urethral dilatation procedure, UCX grew E.coli 11/30  #Abx allergy: NKDA    would recommend:    1. Monitor Temp. and c/w supportive care  2. Please change Meropenem to Ceftriaxone 2 g daily   3. Consider LN biopsy if Fever persists      Attending Attestation:   45 minutes spent on total encounter; more than 50% of the visit was spent counseling and/or coordinating care by the attending physician.

## 2020-12-04 LAB
ALBUMIN SERPL ELPH-MCNC: 3.7 G/DL — SIGNIFICANT CHANGE UP (ref 3.5–5.2)
ALP SERPL-CCNC: 72 U/L — SIGNIFICANT CHANGE UP (ref 30–115)
ALT FLD-CCNC: 48 U/L — HIGH (ref 0–41)
ANION GAP SERPL CALC-SCNC: 11 MMOL/L — SIGNIFICANT CHANGE UP (ref 7–14)
AST SERPL-CCNC: 35 U/L — SIGNIFICANT CHANGE UP (ref 0–41)
BILIRUB SERPL-MCNC: 0.5 MG/DL — SIGNIFICANT CHANGE UP (ref 0.2–1.2)
BUN SERPL-MCNC: 17 MG/DL — SIGNIFICANT CHANGE UP (ref 10–20)
CALCIUM SERPL-MCNC: 8.7 MG/DL — SIGNIFICANT CHANGE UP (ref 8.5–10.1)
CHLORIDE SERPL-SCNC: 105 MMOL/L — SIGNIFICANT CHANGE UP (ref 98–110)
CO2 SERPL-SCNC: 25 MMOL/L — SIGNIFICANT CHANGE UP (ref 17–32)
CREAT SERPL-MCNC: 0.9 MG/DL — SIGNIFICANT CHANGE UP (ref 0.7–1.5)
GLUCOSE BLDC GLUCOMTR-MCNC: 121 MG/DL — HIGH (ref 70–99)
GLUCOSE BLDC GLUCOMTR-MCNC: 124 MG/DL — HIGH (ref 70–99)
GLUCOSE BLDC GLUCOMTR-MCNC: 127 MG/DL — HIGH (ref 70–99)
GLUCOSE BLDC GLUCOMTR-MCNC: 134 MG/DL — HIGH (ref 70–99)
GLUCOSE SERPL-MCNC: 124 MG/DL — HIGH (ref 70–99)
HCT VFR BLD CALC: 36.2 % — LOW (ref 42–52)
HGB BLD-MCNC: 12.4 G/DL — LOW (ref 14–18)
MAGNESIUM SERPL-MCNC: 2.1 MG/DL — SIGNIFICANT CHANGE UP (ref 1.8–2.4)
MCHC RBC-ENTMCNC: 30.4 PG — SIGNIFICANT CHANGE UP (ref 27–31)
MCHC RBC-ENTMCNC: 34.3 G/DL — SIGNIFICANT CHANGE UP (ref 32–37)
MCV RBC AUTO: 88.7 FL — SIGNIFICANT CHANGE UP (ref 80–94)
NRBC # BLD: 0 /100 WBCS — SIGNIFICANT CHANGE UP (ref 0–0)
PLATELET # BLD AUTO: 158 K/UL — SIGNIFICANT CHANGE UP (ref 130–400)
POTASSIUM SERPL-MCNC: 4.7 MMOL/L — SIGNIFICANT CHANGE UP (ref 3.5–5)
POTASSIUM SERPL-SCNC: 4.7 MMOL/L — SIGNIFICANT CHANGE UP (ref 3.5–5)
PROT SERPL-MCNC: 6.5 G/DL — SIGNIFICANT CHANGE UP (ref 6–8)
RBC # BLD: 4.08 M/UL — LOW (ref 4.7–6.1)
RBC # FLD: 12.1 % — SIGNIFICANT CHANGE UP (ref 11.5–14.5)
SODIUM SERPL-SCNC: 141 MMOL/L — SIGNIFICANT CHANGE UP (ref 135–146)
WBC # BLD: 6.48 K/UL — SIGNIFICANT CHANGE UP (ref 4.8–10.8)
WBC # FLD AUTO: 6.48 K/UL — SIGNIFICANT CHANGE UP (ref 4.8–10.8)

## 2020-12-04 RX ORDER — CEFTRIAXONE 500 MG/1
INJECTION, POWDER, FOR SOLUTION INTRAMUSCULAR; INTRAVENOUS
Refills: 0 | Status: DISCONTINUED | OUTPATIENT
Start: 2020-12-04 | End: 2020-12-05

## 2020-12-04 RX ORDER — CEFTRIAXONE 500 MG/1
2000 INJECTION, POWDER, FOR SOLUTION INTRAMUSCULAR; INTRAVENOUS ONCE
Refills: 0 | Status: COMPLETED | OUTPATIENT
Start: 2020-12-04 | End: 2020-12-04

## 2020-12-04 RX ORDER — CEFTRIAXONE 500 MG/1
2000 INJECTION, POWDER, FOR SOLUTION INTRAMUSCULAR; INTRAVENOUS EVERY 24 HOURS
Refills: 0 | Status: DISCONTINUED | OUTPATIENT
Start: 2020-12-05 | End: 2020-12-05

## 2020-12-04 RX ORDER — IBUPROFEN 200 MG
600 TABLET ORAL ONCE
Refills: 0 | Status: COMPLETED | OUTPATIENT
Start: 2020-12-04 | End: 2020-12-04

## 2020-12-04 RX ADMIN — LOSARTAN POTASSIUM 100 MILLIGRAM(S): 100 TABLET, FILM COATED ORAL at 05:19

## 2020-12-04 RX ADMIN — Medication 600 MILLIGRAM(S): at 09:40

## 2020-12-04 RX ADMIN — CEFTRIAXONE 100 MILLIGRAM(S): 500 INJECTION, POWDER, FOR SOLUTION INTRAMUSCULAR; INTRAVENOUS at 05:19

## 2020-12-04 RX ADMIN — ENOXAPARIN SODIUM 40 MILLIGRAM(S): 100 INJECTION SUBCUTANEOUS at 14:53

## 2020-12-04 RX ADMIN — PANTOPRAZOLE SODIUM 40 MILLIGRAM(S): 20 TABLET, DELAYED RELEASE ORAL at 05:19

## 2020-12-04 RX ADMIN — Medication 600 MILLIGRAM(S): at 08:40

## 2020-12-04 NOTE — PROGRESS NOTE ADULT - SUBJECTIVE AND OBJECTIVE BOX
TAYO FONSECA  50y, Male  Allergy: No Known Drug Allergies  shellfish (Anaphylaxis)      LOS  4d    CHIEF COMPLAINT: Fever / Chills (04 Dec 2020 00:42)      INTERVAL EVENTS/HPI  - No acute events overnight  - T(F): , Max: 101 (12-03-20 @ 16:48)  - continues to have fevers; but reports chills are better   - Denies any worsening symptoms  - Tolerating medication  - WBC Count: 6.48 (12-04-20 @ 06:36)  WBC Count: 10.71 (12-02-20 @ 06:42)     - Creatinine, Serum: 0.9 (12-04-20 @ 06:36)       ROS  General: Denies rigors, nightsweats  HEENT: Denies headache, rhinorrhea, sore throat, eye pain  CV: Denies CP, palpitations  PULM: Denies wheezing, hemoptysis  GI: Denies hematemesis, hematochezia, melena  : Denies discharge, hematuria  MSK: Denies arthralgias, myalgias  SKIN: Denies rash, lesions  NEURO: Denies paresthesias, weakness  PSYCH: Denies depression, anxiety    VITALS:  T(F): 98.6, Max: 101 (12-03-20 @ 16:48)  HR: 88  BP: 128/64  RR: 16Vital Signs Last 24 Hrs  T(C): 37 (04 Dec 2020 10:32), Max: 38.3 (03 Dec 2020 16:48)  T(F): 98.6 (04 Dec 2020 10:32), Max: 101 (03 Dec 2020 16:48)  HR: 88 (04 Dec 2020 05:00) (84 - 88)  BP: 128/64 (04 Dec 2020 05:00) (128/64 - 131/81)  BP(mean): --  RR: 16 (04 Dec 2020 05:00) (16 - 16)  SpO2: --    PHYSICAL EXAM:  Gen: NAD, resting in bed  HEENT: Normocephalic, atraumatic  Neck: supple, no lymphadenopathy  CV: Regular rate & regular rhythm  Lungs: decreased BS at bases, no fremitus  Abdomen: Soft, BS present  Ext: Warm, well perfused  Neuro: non focal, awake  Skin: no rash, no erythema  Lines: no phlebitis    FH: Non-contributory  Social Hx: Non-contributory    TESTS & MEASUREMENTS:                        12.4   6.48  )-----------( 158      ( 04 Dec 2020 06:36 )             36.2     12-04    141  |  105  |  17  ----------------------------<  124<H>  4.7   |  25  |  0.9    Ca    8.7      04 Dec 2020 06:36  Mg     2.1     12-04    TPro  6.5  /  Alb  3.7  /  TBili  0.5  /  DBili  x   /  AST  35  /  ALT  48<H>  /  AlkPhos  72  12-04    eGFR if Non African American: 99 mL/min/1.73M2 (12-04-20 @ 06:36)  eGFR if : 115 mL/min/1.73M2 (12-04-20 @ 06:36)    LIVER FUNCTIONS - ( 04 Dec 2020 06:36 )  Alb: 3.7 g/dL / Pro: 6.5 g/dL / ALK PHOS: 72 U/L / ALT: 48 U/L / AST: 35 U/L / GGT: x               Culture - Urine (collected 11-30-20 @ 11:52)  Source: .Urine Clean Catch (Midstream)  Final Report (12-02-20 @ 18:27):    >100,000 CFU/ml Escherichia coli  Organism: Escherichia coli (12-02-20 @ 18:27)  Organism: Escherichia coli (12-02-20 @ 18:27)      -  Amikacin: S <=16      -  Amoxicillin/Clavulanic Acid: S <=8/4      -  Ampicillin: R >16 These ampicillin results predict results for amoxicillin      -  Ampicillin/Sulbactam: R >16/8 Enterobacter, Citrobacter, and Serratia may develop resistance during prolonged therapy (3-4 days)      -  Aztreonam: S <=4      -  Cefazolin: S 16 ([email or web address]_[email or web address]) For uncomplicated UTI with K. pneumoniae, E. coli, or P. mirablis: BILLY <=16 is sensitive and BILLY >=32 is resistant. This also predicts results for oral agents cefaclor, cefdinir, cefpodoxime, cefprozil, cefuroxime axetil, cephalexin and locarbef for uncomplicated UTI. Note that some isolates may be susceptible to these agents while testing resistant to cefazolin.      -  Cefepime: S <=2      -  Cefoxitin: S <=8      -  Ceftriaxone: S <=1 Enterobacter, Citrobacter, and Serratia may develop resistance during prolonged therapy      -  Ciprofloxacin: S <=0.25      -  Ertapenem: S <=0.5      -  Gentamicin: S <=2      -  Imipenem: S <=1      -  Levofloxacin: S <=0.5      -  Meropenem: S <=1      -  Nitrofurantoin: S <=32 Should not be used to treat pyelonephritis      -  Piperacillin/Tazobactam: S <=8      -  Tigecycline: S <=2      -  Tobramycin: S <=2      -  Trimethoprim/Sulfamethoxazole: R >2/38      Method Type: BILLY    Culture - Blood (collected 11-30-20 @ 11:13)  Source: .Blood Blood-Peripheral  Preliminary Report (12-01-20 @ 22:02):    No growth to date.    Culture - Blood (collected 11-30-20 @ 11:13)  Source: .Blood Blood-Peripheral  Preliminary Report (12-01-20 @ 22:02):    No growth to date.        Blood Gas Venous - Lactate: 2.0 mmoL/L (11-30-20 @ 11:40)      INFECTIOUS DISEASES TESTING  COVID-19 PCR: NotDetec (12-02-20 @ 07:00)  Rapid RVP Result: NotDetec (11-30-20 @ 10:42)      INFLAMMATORY MARKERS      RADIOLOGY & ADDITIONAL TESTS:  I have personally reviewed the last available Chest xray  CXR      CT      CARDIOLOGY TESTING  12 Lead ECG:   Ventricular Rate 119 BPM    Atrial Rate 119 BPM    P-R Interval 142 ms    QRS Duration 92 ms    Q-T Interval 326 ms    QTC Calculation(Bazett) 458 ms    P Axis 61 degrees    R Axis -31 degrees    T Axis 2 degrees    Diagnosis Line Sinus tachycardia  Left axis deviation  Abnormal ECG    Confirmed by NELIDA FLORES MD ([id number]) on 12/1/2020 11:39:00 AM (11-30-20 @ 10:57)  12 Lead ECG:   Ventricular Rate 130 BPM    Atrial Rate 130 BPM    P-R Interval 130 ms    QRS Duration 90 ms    Q-T Interval 304 ms    QTC Calculation(Bazett) 447 ms    P Axis 77 degrees    R Axis -36 degrees    T Axis 34 degrees    Diagnosis Line Sinus tachycardia  Left axis deviation  Nonspecific ST and T wave abnormality  Abnormal ECG    Confirmed by NELIDA FLORES MD ([id number]) on 11/30/2020 11:21:07 AM (11-30-20 @ 10:55)      MEDICATIONS  cefTRIAXone   IVPB     enoxaparin Injectable 40 SubCutaneous daily  influenza   Vaccine 0.5 IntraMuscular once  losartan 100 Oral daily  pantoprazole    Tablet 40 Oral before breakfast      WEIGHT  Weight (kg): 122.8 (11-30-20 @ 13:00)  Creatinine, Serum: 0.9 mg/dL (12-04-20 @ 06:36)      ANTIBIOTICS:  cefTRIAXone   IVPB          All available historical records have been reviewed

## 2020-12-04 NOTE — PROGRESS NOTE ADULT - SUBJECTIVE AND OBJECTIVE BOX
Name: TAYO FONSECA  Age: 50y  Gender: Male    Pt was seen and examined.   c/o:    Allergies:  No Known Drug Allergies  shellfish (Anaphylaxis)      PHYSICAL EXAM:    Vitals:  T(C): 37.4 (12-03-20 @ 21:22), Max: 38.5 (12-03-20 @ 06:09)  HR: 84 (12-03-20 @ 21:22) (84 - 106)  BP: 128/75 (12-03-20 @ 21:22) (128/75 - 133/62)  RR: 16 (12-03-20 @ 21:22) (16 - 16)  SpO2: --  Wt(kg): --Vital Signs Last 24 Hrs  T(C): 37.4 (03 Dec 2020 21:22), Max: 38.5 (03 Dec 2020 06:09)  T(F): 99.4 (03 Dec 2020 21:22), Max: 101.3 (03 Dec 2020 06:09)  HR: 84 (03 Dec 2020 21:22) (84 - 106)  BP: 128/75 (03 Dec 2020 21:22) (128/75 - 133/62)  BP(mean): --  RR: 16 (03 Dec 2020 21:22) (16 - 16)  SpO2: --      NECK: Supple, No JVD  CHEST/LUNG: CTA, B/L, No rales, rhonchi, wheezing, or rubs  HEART: S1,S2, N1 Regular rate and rhythm; No murmurs, rubs, or gallops  ABDOMEN: Soft, Nontender, Nondistended; Bowel sounds present  EXTREMITIES:  2+ Peripheral Pulses, No clubbing, cyanosis, or edema      LABS:                        12.2   10.71 )-----------( 134      ( 02 Dec 2020 06:42 )             35.4     12-02    133<L>  |  99  |  15  ----------------------------<  160<H>  4.3   |  21  |  0.9    Ca    8.7      02 Dec 2020 06:42    TPro  6.4  /  Alb  3.8  /  TBili  0.8  /  DBili  x   /  AST  18  /  ALT  33  /  AlkPhos  62  12-02    LIVER FUNCTIONS - ( 02 Dec 2020 06:42 )  Alb: 3.8 g/dL / Pro: 6.4 g/dL / ALK PHOS: 62 U/L / ALT: 33 U/L / AST: 18 U/L / GGT: x                 MEDICATIONS  (STANDING):  enoxaparin Injectable 40 milliGRAM(s) SubCutaneous daily  influenza   Vaccine 0.5 milliLiter(s) IntraMuscular once  losartan 100 milliGRAM(s) Oral daily  meropenem  IVPB 1000 milliGRAM(s) IV Intermittent every 8 hours  meropenem  IVPB      pantoprazole    Tablet 40 milliGRAM(s) Oral before breakfast        RADIOLOGY & ADDITIONAL TESTS:    Imaging Personally Reviewed:  [ ] YES  [ ] NO    A/P:  Assessment and Plan:    Assessment:  · Assessment	  51 y/o male chronic UTI / ? drug resistant     Problem/Plan - 1:         Sepsis on admission 2/2 recurrent UTI's.sepsis on admission     Problem: Chronic recurrent  UTI.  Plan: Admit inpatient medicine  clinically pt looks good and non toxic.  will change meropenem to ceftriaxone     Problem/Plan - 2:  ·  Problem: Type 2 diabetes mellitus without complication, without long-term current use of insulin.  Plan: FS monitoring  start metformin again daily  diet control.      Problem/Plan - 3:  ·  Problem: Hypertension, unspecified type.  Plan: continue home medications  monitor v/s.     PLAN hope to d/c in 24 hrs or so

## 2020-12-04 NOTE — PROGRESS NOTE ADULT - SUBJECTIVE AND OBJECTIVE BOX
Name: TAYO FONSECA  Age: 50y  Gender: Male    Pt was seen and examined.   c/o:  doing much better today, no fever this afternoon he says    Allergies:  No Known Drug Allergies  shellfish (Anaphylaxis)      PHYSICAL EXAM:    Vitals:  T(C): 36.8 (12-04-20 @ 22:04), Max: 37 (12-04-20 @ 10:32)  HR: 71 (12-04-20 @ 22:04) (66 - 88)  BP: 135/85 (12-04-20 @ 22:04) (128/64 - 146/76)  RR: 16 (12-04-20 @ 22:04) (16 - 16)  SpO2: --  Wt(kg): --Vital Signs Last 24 Hrs  T(C): 36.8 (04 Dec 2020 22:04), Max: 37 (04 Dec 2020 10:32)  T(F): 98.2 (04 Dec 2020 22:04), Max: 98.6 (04 Dec 2020 10:32)  HR: 71 (04 Dec 2020 22:04) (66 - 88)  BP: 135/85 (04 Dec 2020 22:04) (128/64 - 146/76)  BP(mean): --  RR: 16 (04 Dec 2020 22:04) (16 - 16)  SpO2: --      NECK: Supple, No JVD  CHEST/LUNG: CTA, B/L, No rales, rhonchi, wheezing, or rubs  HEART: S1,S2, N1 Regular rate and rhythm; No murmurs, rubs, or gallops  ABDOMEN: Soft, Nontender, Nondistended; Bowel sounds present  EXTREMITIES:  2+ Peripheral Pulses, No clubbing, cyanosis, or edema      LABS:                        12.4   6.48  )-----------( 158      ( 04 Dec 2020 06:36 )             36.2     12-04    141  |  105  |  17  ----------------------------<  124<H>  4.7   |  25  |  0.9    Ca    8.7      04 Dec 2020 06:36  Mg     2.1     12-04    TPro  6.5  /  Alb  3.7  /  TBili  0.5  /  DBili  x   /  AST  35  /  ALT  48<H>  /  AlkPhos  72  12-04    LIVER FUNCTIONS - ( 04 Dec 2020 06:36 )  Alb: 3.7 g/dL / Pro: 6.5 g/dL / ALK PHOS: 72 U/L / ALT: 48 U/L / AST: 35 U/L / GGT: x                 MEDICATIONS  (STANDING):  cefTRIAXone   IVPB      enoxaparin Injectable 40 milliGRAM(s) SubCutaneous daily  influenza   Vaccine 0.5 milliLiter(s) IntraMuscular once  losartan 100 milliGRAM(s) Oral daily  pantoprazole    Tablet 40 milliGRAM(s) Oral before breakfast        RADIOLOGY & ADDITIONAL TESTS:    Imaging Personally Reviewed:  [ ] YES  [ ] NO    A/P:   Problem/Plan - 1:         Sepsis on admission 2/2 recurrent UTI's.sepsis on admission     Problem: Chronic recurrent  UTI.  no fevers for 24 hrs  clinically pt looks good and non toxic.  will change meropenem to ceftriaxone  will d/c home on Vantin 200mg po q12 for 10 days.     Problem/Plan - 2:  ·  Problem: Type 2 diabetes mellitus without complication, without long-term current use of insulin.  Plan: FS monitoring  start metformin again daily  diet control.      Problem/Plan - 3:  ·  Problem: Hypertension, unspecified type.  Plan: continue home medications  monitor v/s.     PLAN hope to d/c in 24 hrs or so

## 2020-12-04 NOTE — PROGRESS NOTE ADULT - ASSESSMENT
51 y/o Male with a pmhx HTN, pre-diabetes who presents with fever and nausea      IMPRESSION  #Sepsis on admission (Fevers, Tachycardia, WBC) possibly from ascending UTI/Pyelonephritis  - < from: CT Abdomen and Pelvis No Cont (12.01.20 @ 12:49) >  Left ureter surrounding inflammation without a radiopaque obstructing calculus. Findings may be due to recently passed calculus versus ascending urinary tract infection. Correlate with urinalysis. Mildly prominent nonspecific portacaval lymph node measuring 1.8 cm. Short term follow up is recommended.    #Recurrent UTI - with hx of urethral dilatation procedure, UCX grew E.coli 11/30  #Abx allergy: NKDA    Recommendations  - continue ceftriaxone 2g daily  - would plan for at least 2 week course (end date 12/13)  - can finish course with vantin 200 mg BID if planned for discharge  - if persistently febrile, would consider LN biopsy     Please call or message on [employer] Teams if with any questions.  Spectra [phone]

## 2020-12-05 ENCOUNTER — TRANSCRIPTION ENCOUNTER (OUTPATIENT)
Age: 50
End: 2020-12-05

## 2020-12-05 VITALS
SYSTOLIC BLOOD PRESSURE: 163 MMHG | TEMPERATURE: 97 F | HEART RATE: 70 BPM | DIASTOLIC BLOOD PRESSURE: 72 MMHG | RESPIRATION RATE: 16 BRPM

## 2020-12-05 LAB
CULTURE RESULTS: SIGNIFICANT CHANGE UP
CULTURE RESULTS: SIGNIFICANT CHANGE UP
GLUCOSE BLDC GLUCOMTR-MCNC: 121 MG/DL — HIGH (ref 70–99)
GLUCOSE BLDC GLUCOMTR-MCNC: 133 MG/DL — HIGH (ref 70–99)
SPECIMEN SOURCE: SIGNIFICANT CHANGE UP
SPECIMEN SOURCE: SIGNIFICANT CHANGE UP

## 2020-12-05 RX ORDER — CEFPODOXIME PROXETIL 100 MG
1 TABLET ORAL
Qty: 20 | Refills: 0
Start: 2020-12-05 | End: 2020-12-14

## 2020-12-05 RX ADMIN — INFLUENZA VIRUS VACCINE 0.5 MILLILITER(S): 15; 15; 15; 15 SUSPENSION INTRAMUSCULAR at 12:03

## 2020-12-05 RX ADMIN — PANTOPRAZOLE SODIUM 40 MILLIGRAM(S): 20 TABLET, DELAYED RELEASE ORAL at 06:19

## 2020-12-05 RX ADMIN — LOSARTAN POTASSIUM 100 MILLIGRAM(S): 100 TABLET, FILM COATED ORAL at 06:19

## 2020-12-05 RX ADMIN — CEFTRIAXONE 100 MILLIGRAM(S): 500 INJECTION, POWDER, FOR SOLUTION INTRAMUSCULAR; INTRAVENOUS at 00:04

## 2020-12-05 NOTE — DISCHARGE NOTE PROVIDER - HOSPITAL COURSE
AS PER  PMD pt was admitted for recurrent UTI's and sepsis.  he was found to have MDR uti.  ID was consulted and pt did well.  He was discharged on abx as per ID.  He was discharged in stable condition.  he was referred to  for furher w/u as he did not want to got to his  in [place].

## 2020-12-05 NOTE — DISCHARGE NOTE PROVIDER - NSDCMRMEDTOKEN_GEN_ALL_CORE_FT
cefpodoxime 200 mg oral tablet: 1 tab(s) orally every 12 hours   losartan:   metFORMIN 500 mg oral tablet, extended release: 1 tab(s) orally once a day

## 2020-12-05 NOTE — DISCHARGE NOTE PROVIDER - CARE PROVIDER_API CALL
[facility]  [address] [place] [address]  Phone: [phone]  Fax: [phone]  Established Patient  Follow Up Time:     [facility]n [facility]acker  UROLOGY  [address] [place] [address]  Phone: [phone]  Fax: [phone]  Follow Up Time:

## 2020-12-05 NOTE — DISCHARGE NOTE PROVIDER - NSDCCPCAREPLAN_GEN_ALL_CORE_FT
PRINCIPAL DISCHARGE DIAGNOSIS  Diagnosis: Urinary tract infection  Assessment and Plan of Treatment: complete antibiotics as prescribed  increase fluids intake      SECONDARY DISCHARGE DIAGNOSES  Diagnosis: Sepsis  Assessment and Plan of Treatment: take all antibiotics as prescribed

## 2020-12-05 NOTE — DISCHARGE NOTE PROVIDER - NSFOLLOWUPCLINICS_GEN_ALL_ED_FT
A Family Medicine Doctor  Family Medicine  .  [place]   Phone:   Fax:   Follow Up Time: 1 week    A Urologist  Urology  .  [place]   Phone:   Fax:   Follow Up Time: 1 week

## 2020-12-05 NOTE — DISCHARGE NOTE NURSING/CASE MANAGEMENT/SOCIAL WORK - PATIENT PORTAL LINK FT
You can access the FollowMyHealth Patient Portal offered by [facility] by registering at the following website: [email or web address]. By joining [employer]’s FollowMyHealth portal, you will also be able to view your health information using other applications (apps) compatible with our system.

## 2020-12-07 PROBLEM — E11.9 TYPE 2 DIABETES MELLITUS WITHOUT COMPLICATIONS: Chronic | Status: ACTIVE | Noted: 2020-11-30

## 2020-12-07 PROBLEM — N39.0 URINARY TRACT INFECTION, SITE NOT SPECIFIED: Chronic | Status: ACTIVE | Noted: 2020-11-30

## 2020-12-07 PROBLEM — I10 ESSENTIAL (PRIMARY) HYPERTENSION: Chronic | Status: ACTIVE | Noted: 2020-11-30

## 2020-12-08 DIAGNOSIS — I10 ESSENTIAL (PRIMARY) HYPERTENSION: ICD-10-CM

## 2020-12-08 DIAGNOSIS — Z87.440 PERSONAL HISTORY OF URINARY (TRACT) INFECTIONS: ICD-10-CM

## 2020-12-08 DIAGNOSIS — B96.20 UNSPECIFIED ESCHERICHIA COLI [E. COLI] AS THE CAUSE OF DISEASES CLASSIFIED ELSEWHERE: ICD-10-CM

## 2020-12-08 DIAGNOSIS — A41.9 SEPSIS, UNSPECIFIED ORGANISM: ICD-10-CM

## 2020-12-08 DIAGNOSIS — N10 ACUTE PYELONEPHRITIS: ICD-10-CM

## 2020-12-08 DIAGNOSIS — Z79.84 LONG TERM (CURRENT) USE OF ORAL HYPOGLYCEMIC DRUGS: ICD-10-CM

## 2020-12-08 DIAGNOSIS — Z16.30 RESISTANCE TO UNSPECIFIED ANTIMICROBIAL DRUGS: ICD-10-CM

## 2020-12-08 DIAGNOSIS — Z91.013 ALLERGY TO SEAFOOD: ICD-10-CM

## 2020-12-08 DIAGNOSIS — N28.89 OTHER SPECIFIED DISORDERS OF KIDNEY AND URETER: ICD-10-CM

## 2020-12-08 DIAGNOSIS — E66.9 OBESITY, UNSPECIFIED: ICD-10-CM

## 2020-12-08 DIAGNOSIS — E11.9 TYPE 2 DIABETES MELLITUS WITHOUT COMPLICATIONS: ICD-10-CM

## 2020-12-08 DIAGNOSIS — N39.0 URINARY TRACT INFECTION, SITE NOT SPECIFIED: ICD-10-CM

## 2020-12-08 DIAGNOSIS — B96.1 KLEBSIELLA PNEUMONIAE [K. PNEUMONIAE] AS THE CAUSE OF DISEASES CLASSIFIED ELSEWHERE: ICD-10-CM

## 2020-12-08 DIAGNOSIS — Z91.14 PATIENT'S OTHER NONCOMPLIANCE WITH MEDICATION REGIMEN: ICD-10-CM

## 2020-12-09 ENCOUNTER — APPOINTMENT (OUTPATIENT)
Dept: UROLOGY | Facility: CLINIC | Age: 50
End: 2020-12-09
Payer: COMMERCIAL

## 2020-12-09 VITALS — SYSTOLIC BLOOD PRESSURE: 132 MMHG | TEMPERATURE: 97.6 F | DIASTOLIC BLOOD PRESSURE: 79 MMHG | HEART RATE: 79 BPM

## 2020-12-09 DIAGNOSIS — Z87.891 PERSONAL HISTORY OF NICOTINE DEPENDENCE: ICD-10-CM

## 2020-12-09 DIAGNOSIS — Z82.3 FAMILY HISTORY OF STROKE: ICD-10-CM

## 2020-12-09 DIAGNOSIS — Z82.49 FAMILY HISTORY OF ISCHEMIC HEART DISEASE AND OTHER DISEASES OF THE CIRCULATORY SYSTEM: ICD-10-CM

## 2020-12-09 DIAGNOSIS — Z78.9 OTHER SPECIFIED HEALTH STATUS: ICD-10-CM

## 2020-12-09 DIAGNOSIS — Z80.52 FAMILY HISTORY OF MALIGNANT NEOPLASM OF BLADDER: ICD-10-CM

## 2020-12-09 DIAGNOSIS — Z83.3 FAMILY HISTORY OF DIABETES MELLITUS: ICD-10-CM

## 2020-12-09 DIAGNOSIS — Z82.5 FAMILY HISTORY OF ASTHMA AND OTHER CHRONIC LOWER RESPIRATORY DISEASES: ICD-10-CM

## 2020-12-09 LAB
BILIRUB UR QL STRIP: 1
CLARITY UR: CLEAR
COLLECTION METHOD: NORMAL
GLUCOSE UR-MCNC: NORMAL
HCG UR QL: 2 EU/DL
HGB UR QL STRIP.AUTO: NORMAL
KETONES UR-MCNC: NORMAL
LEUKOCYTE ESTERASE UR QL STRIP: NORMAL
NITRITE UR QL STRIP: NORMAL
PH UR STRIP: 6
PROT UR STRIP-MCNC: 30
SP GR UR STRIP: 1.01

## 2020-12-09 PROCEDURE — 99204 OFFICE O/P NEW MOD 45 MIN: CPT

## 2020-12-09 PROCEDURE — 81003 URINALYSIS AUTO W/O SCOPE: CPT | Mod: QW

## 2020-12-09 PROCEDURE — 99072 ADDL SUPL MATRL&STAF TM PHE: CPT

## 2020-12-09 RX ORDER — METFORMIN HYDROCHLORIDE 500 MG/1
500 TABLET, FILM COATED, EXTENDED RELEASE ORAL
Refills: 0 | Status: ACTIVE | COMMUNITY

## 2020-12-09 RX ORDER — CEFPODOXIME PROXETIL 200 MG/1
200 TABLET, FILM COATED ORAL
Refills: 0 | Status: ACTIVE | COMMUNITY

## 2020-12-09 RX ORDER — LOSARTAN POTASSIUM 100 MG/1
TABLET, FILM COATED ORAL
Refills: 0 | Status: ACTIVE | COMMUNITY

## 2020-12-09 NOTE — HISTORY OF PRESENT ILLNESS
[Urinary Frequency] : urinary frequency [Nocturia] : nocturia [None] : None [FreeTextEntry1] : 50-year-old male referred for consultation regarding recent episode of sepsis approximately 2 weeks ago. He reports being treated by his previous urologist[Nael Maurice] for suspect urinary tract infection with Bactrim. He reports continued dysuria and fever despite antibiotic therapy. He did not remember whether urine culture was obtained. Eventually patient was admitted to the [facility] for IV antibiotics. Presently on oral cephalosporin.\par All available data reviewed= urine culture: Escherichia coli/ CT scanning= left ureteral inflammation, no calculus seen.\par Reports urethral dilation roughly 10 years ago resulting and splitting of urinary stream since that time. [Straining] : no straining [Weak Stream] : no weak stream [Dysuria] : no dysuria [Hematuria - Gross] : no gross hematuria

## 2020-12-09 NOTE — ASSESSMENT
[FreeTextEntry1] : #1. Sepsis-- etiology unclear at this time\par #2. Questionable urethral stricture\par \par Plan\par -Evaluation for incomplete bladder emptying\par -Bladder ultrasound PVR--after completion of antibiotic therapy\par -Finish antibiotic therapy\par -Return after ultrasound

## 2020-12-09 NOTE — PHYSICAL EXAM
[General Appearance - Well Developed] : well developed [Normal Appearance] : normal appearance [Well Groomed] : well groomed [General Appearance - In No Acute Distress] : no acute distress [Edema] : no peripheral edema [Respiration, Rhythm And Depth] : normal respiratory rhythm and effort [Exaggerated Use Of Accessory Muscles For Inspiration] : no accessory muscle use [Abdomen Soft] : soft [Abdomen Tenderness] : non-tender [Costovertebral Angle Tenderness] : no ~M costovertebral angle tenderness [Urethral Meatus] : meatus normal [Urinary Bladder Findings] : the bladder was normal on palpation [Scrotum] : the scrotum was normal [Testes Mass (___cm)] : there were no testicular masses [No Prostate Nodules] : no prostate nodules [Normal Station and Gait] : the gait and station were normal for the patient's age [] : no rash [No Focal Deficits] : no focal deficits [Oriented To Time, Place, And Person] : oriented to person, place, and time [Affect] : the affect was normal [Mood] : the mood was normal [Not Anxious] : not anxious [No Palpable Adenopathy] : no palpable adenopathy [FreeTextEntry1] : paras

## 2020-12-21 ENCOUNTER — LABORATORY RESULT (OUTPATIENT)
Age: 50
End: 2020-12-21

## 2020-12-21 ENCOUNTER — NON-APPOINTMENT (OUTPATIENT)
Age: 50
End: 2020-12-21

## 2020-12-24 ENCOUNTER — NON-APPOINTMENT (OUTPATIENT)
Age: 50
End: 2020-12-24

## 2020-12-24 RX ORDER — CIPROFLOXACIN HYDROCHLORIDE 500 MG/1
500 TABLET, FILM COATED ORAL TWICE DAILY
Qty: 14 | Refills: 0 | Status: ACTIVE | COMMUNITY
Start: 2020-12-24 | End: 1900-01-01

## 2020-12-28 ENCOUNTER — NON-APPOINTMENT (OUTPATIENT)
Age: 50
End: 2020-12-28

## 2020-12-28 LAB
APPEARANCE: CLEAR
BACTERIA UR CULT: ABNORMAL
BILIRUBIN URINE: NEGATIVE
BLOOD URINE: NEGATIVE
COLOR: NORMAL
GLUCOSE QUALITATIVE U: NEGATIVE
KETONES URINE: NEGATIVE
LEUKOCYTE ESTERASE URINE: ABNORMAL
NITRITE URINE: NEGATIVE
PH URINE: 6
PROTEIN URINE: NEGATIVE
SPECIFIC GRAVITY URINE: 1.01
UROBILINOGEN URINE: NORMAL

## 2021-01-05 ENCOUNTER — NON-APPOINTMENT (OUTPATIENT)
Age: 51
End: 2021-01-05

## 2021-01-05 LAB — BACTERIA UR CULT: NORMAL

## 2021-01-07 ENCOUNTER — OUTPATIENT (OUTPATIENT)
Dept: OUTPATIENT SERVICES | Facility: HOSPITAL | Age: 51
LOS: 1 days | Discharge: HOME | End: 2021-01-07
Payer: COMMERCIAL

## 2021-01-07 DIAGNOSIS — Z98.890 OTHER SPECIFIED POSTPROCEDURAL STATES: Chronic | ICD-10-CM

## 2021-01-07 DIAGNOSIS — N39.0 URINARY TRACT INFECTION, SITE NOT SPECIFIED: ICD-10-CM

## 2021-01-07 PROCEDURE — 76857 US EXAM PELVIC LIMITED: CPT | Mod: 26

## 2021-01-29 ENCOUNTER — APPOINTMENT (OUTPATIENT)
Dept: UROLOGY | Facility: CLINIC | Age: 51
End: 2021-01-29
Payer: COMMERCIAL

## 2021-01-29 VITALS
DIASTOLIC BLOOD PRESSURE: 95 MMHG | HEIGHT: 68 IN | SYSTOLIC BLOOD PRESSURE: 154 MMHG | BODY MASS INDEX: 39.56 KG/M2 | HEART RATE: 93 BPM | WEIGHT: 261 LBS | TEMPERATURE: 98.9 F

## 2021-01-29 LAB
BILIRUB UR QL STRIP: NORMAL
CLARITY UR: CLEAR
COLLECTION METHOD: NORMAL
GLUCOSE UR-MCNC: NORMAL
HCG UR QL: 0.2 EU/DL
HGB UR QL STRIP.AUTO: NORMAL
KETONES UR-MCNC: NORMAL
LEUKOCYTE ESTERASE UR QL STRIP: NORMAL
NITRITE UR QL STRIP: NORMAL
PH UR STRIP: 6.5
PROT UR STRIP-MCNC: 30
SP GR UR STRIP: 1.02

## 2021-01-29 PROCEDURE — 81003 URINALYSIS AUTO W/O SCOPE: CPT | Mod: QW

## 2021-01-29 PROCEDURE — 99213 OFFICE O/P EST LOW 20 MIN: CPT

## 2021-01-29 PROCEDURE — 99072 ADDL SUPL MATRL&STAF TM PHE: CPT

## 2021-01-29 RX ORDER — CEFUROXIME AXETIL 250 MG/1
250 TABLET ORAL
Qty: 30 | Refills: 5 | Status: ACTIVE | COMMUNITY
Start: 2021-01-29 | End: 1900-01-01

## 2021-01-29 NOTE — HISTORY OF PRESENT ILLNESS
[Urinary Frequency] : urinary frequency [Nocturia] : nocturia [None] : None [FreeTextEntry1] : 50-year-old male here   regarding  episode of sepsis approximately in December. . He reports being treated by his previous urologist[Nael Maurice] for suspect urinary tract infection with Bactrim. \par All available data reviewed= urine culture: Escherichia coli/ CT scanning= left ureteral inflammation, no calculus seen.\par Reports urethral dilation roughly 10 years ago resulting and splitting of urinary stream since that time.\par \par 1/21 ultrasound imaging reviewed =289 cc volume / 36 cc PVR\par 1 /21 urinalysis= protein [Straining] : no straining [Weak Stream] : no weak stream [Dysuria] : no dysuria [Hematuria - Gross] : no gross hematuria

## 2021-01-29 NOTE — ASSESSMENT
[FreeTextEntry1] : #1. Sepsis-- uncircumcised   \par #2. BPH\par #3. Lower urinary tract symptoms, mild\par \par Plan\par -cefuroxime 250 mg hs x 6 months -\par -desires psa testing \par -rtn 12 months

## 2021-01-29 NOTE — PHYSICAL EXAM
[General Appearance - Well Developed] : well developed [Normal Appearance] : normal appearance [Well Groomed] : well groomed [General Appearance - In No Acute Distress] : no acute distress [Abdomen Soft] : soft [Abdomen Tenderness] : non-tender [Costovertebral Angle Tenderness] : no ~M costovertebral angle tenderness [Urethral Meatus] : meatus normal [Penis Abnormality] : normal uncircumcised penis [Scrotum] : the scrotum was normal [Testes Tenderness] : no tenderness of the testes [Testes Mass (___cm)] : there were no testicular masses [Skin Color & Pigmentation] : normal skin color and pigmentation [Edema] : no peripheral edema [] : no respiratory distress [Respiration, Rhythm And Depth] : normal respiratory rhythm and effort [Exaggerated Use Of Accessory Muscles For Inspiration] : no accessory muscle use [Affect] : the affect was normal [Oriented To Time, Place, And Person] : oriented to person, place, and time [Mood] : the mood was normal [Not Anxious] : not anxious [Normal Station and Gait] : the gait and station were normal for the patient's age [No Focal Deficits] : no focal deficits [No Palpable Adenopathy] : no palpable adenopathy [Prostate Tenderness] : the prostate was not tender [No Prostate Nodules] : no prostate nodules [Prostate Size ___ gm] : prostate size [unfilled] gm [FreeTextEntry1] : sulcus pos

## 2021-02-02 LAB — BACTERIA UR CULT: NORMAL

## 2021-06-21 NOTE — ED PROVIDER NOTE - NS ED MD DISPO SPECIAL CONSIDERATION1
no abdominal pain/no chills/no cough/no decreased eating/drinking/no diarrhea/no fever/no headache/no rash/no shortness of breath/no vomiting
None

## 2022-01-04 ENCOUNTER — NON-APPOINTMENT (OUTPATIENT)
Age: 52
End: 2022-01-04

## 2022-02-17 NOTE — H&P ADULT - PROBLEM SELECTOR PLAN 1
Admit inpatient medicine  Start IV abx  Infectious disease consult  Monitor labs  Follow cultures none

## 2022-04-19 ENCOUNTER — APPOINTMENT (OUTPATIENT)
Dept: UROLOGY | Facility: CLINIC | Age: 52
End: 2022-04-19
Payer: COMMERCIAL

## 2022-04-19 PROCEDURE — 99213 OFFICE O/P EST LOW 20 MIN: CPT

## 2022-10-19 ENCOUNTER — NON-APPOINTMENT (OUTPATIENT)
Age: 52
End: 2022-10-19

## 2022-10-27 ENCOUNTER — NON-APPOINTMENT (OUTPATIENT)
Age: 52
End: 2022-10-27

## 2022-11-02 ENCOUNTER — APPOINTMENT (OUTPATIENT)
Dept: UROLOGY | Facility: CLINIC | Age: 52
End: 2022-11-02

## 2022-11-02 VITALS
DIASTOLIC BLOOD PRESSURE: 88 MMHG | BODY MASS INDEX: 39.4 KG/M2 | HEIGHT: 68 IN | HEART RATE: 88 BPM | WEIGHT: 260 LBS | SYSTOLIC BLOOD PRESSURE: 159 MMHG

## 2022-11-02 DIAGNOSIS — Z00.00 ENCOUNTER FOR GENERAL ADULT MEDICAL EXAMINATION W/OUT ABNORMAL FINDINGS: ICD-10-CM

## 2022-11-02 LAB
BILIRUB UR QL STRIP: NORMAL
COLLECTION METHOD: NORMAL
GLUCOSE UR-MCNC: NORMAL
HCG UR QL: 0.2 EU/DL
HGB UR QL STRIP.AUTO: NORMAL
KETONES UR-MCNC: NORMAL
LEUKOCYTE ESTERASE UR QL STRIP: NORMAL
NITRITE UR QL STRIP: NORMAL
PH UR STRIP: 5.5
PROT UR STRIP-MCNC: 300
SP GR UR STRIP: 1.03

## 2022-11-02 PROCEDURE — 99213 OFFICE O/P EST LOW 20 MIN: CPT

## 2023-04-26 ENCOUNTER — APPOINTMENT (OUTPATIENT)
Dept: UROLOGY | Facility: CLINIC | Age: 53
End: 2023-04-26

## 2023-06-14 ENCOUNTER — APPOINTMENT (OUTPATIENT)
Dept: UROLOGY | Facility: CLINIC | Age: 53
End: 2023-06-14
Payer: COMMERCIAL

## 2023-06-14 VITALS
HEART RATE: 89 BPM | DIASTOLIC BLOOD PRESSURE: 89 MMHG | TEMPERATURE: 98 F | HEIGHT: 68 IN | BODY MASS INDEX: 39.4 KG/M2 | SYSTOLIC BLOOD PRESSURE: 160 MMHG | RESPIRATION RATE: 14 BRPM | OXYGEN SATURATION: 98 % | WEIGHT: 260 LBS

## 2023-06-14 PROCEDURE — 99214 OFFICE O/P EST MOD 30 MIN: CPT

## 2023-06-14 RX ORDER — SILDENAFIL 20 MG/1
20 TABLET ORAL
Qty: 30 | Refills: 0 | Status: ACTIVE | COMMUNITY
Start: 2023-06-14 | End: 1900-01-01

## 2023-09-18 ENCOUNTER — NON-APPOINTMENT (OUTPATIENT)
Age: 53
End: 2023-09-18

## 2023-11-30 ENCOUNTER — LABORATORY RESULT (OUTPATIENT)
Age: 53
End: 2023-11-30

## 2023-11-30 ENCOUNTER — APPOINTMENT (OUTPATIENT)
Dept: UROLOGY | Facility: CLINIC | Age: 53
End: 2023-11-30
Payer: COMMERCIAL

## 2023-11-30 DIAGNOSIS — N39.0 URINARY TRACT INFECTION, SITE NOT SPECIFIED: ICD-10-CM

## 2023-11-30 LAB
BILIRUB UR QL STRIP: NORMAL
COLLECTION METHOD: NORMAL
GLUCOSE UR-MCNC: NORMAL
HCG UR QL: 0.2 EU/DL
HGB UR QL STRIP.AUTO: NORMAL
KETONES UR-MCNC: NORMAL
LEUKOCYTE ESTERASE UR QL STRIP: NORMAL
NITRITE UR QL STRIP: NORMAL
PH UR STRIP: 5.5
PROT UR STRIP-MCNC: 100
SP GR UR STRIP: 1.03

## 2023-11-30 PROCEDURE — 99214 OFFICE O/P EST MOD 30 MIN: CPT

## 2023-11-30 RX ORDER — METHENAMINE HIPPURATE 1 G/1
1 TABLET ORAL
Qty: 180 | Refills: 1 | Status: ACTIVE | COMMUNITY
Start: 2023-11-30 | End: 1900-01-01

## 2023-12-01 ENCOUNTER — NON-APPOINTMENT (OUTPATIENT)
Age: 53
End: 2023-12-01

## 2023-12-04 ENCOUNTER — NON-APPOINTMENT (OUTPATIENT)
Age: 53
End: 2023-12-04

## 2023-12-04 LAB — BACTERIA UR CULT: NORMAL

## 2024-01-09 ENCOUNTER — APPOINTMENT (OUTPATIENT)
Dept: UROLOGY | Facility: CLINIC | Age: 54
End: 2024-01-09

## 2024-01-12 ENCOUNTER — APPOINTMENT (OUTPATIENT)
Dept: UROLOGY | Facility: CLINIC | Age: 54
End: 2024-01-12

## 2024-01-22 ENCOUNTER — APPOINTMENT (OUTPATIENT)
Dept: UROLOGY | Facility: CLINIC | Age: 54
End: 2024-01-22
Payer: COMMERCIAL

## 2024-01-22 LAB
BILIRUB UR QL STRIP: NORMAL
COLLECTION METHOD: NORMAL
GLUCOSE UR-MCNC: NORMAL
HCG UR QL: 0.2 EU/DL
HGB UR QL STRIP.AUTO: NORMAL
KETONES UR-MCNC: NORMAL
LEUKOCYTE ESTERASE UR QL STRIP: NORMAL
NITRITE UR QL STRIP: NORMAL
PH UR STRIP: 6
PROT UR STRIP-MCNC: 100
SP GR UR STRIP: 1.02

## 2024-01-22 PROCEDURE — 81003 URINALYSIS AUTO W/O SCOPE: CPT | Mod: QW

## 2024-01-22 PROCEDURE — 52000 CYSTOURETHROSCOPY: CPT

## 2024-01-22 RX ORDER — LISINOPRIL 30 MG/1
TABLET ORAL
Refills: 0 | Status: ACTIVE | COMMUNITY

## 2024-03-06 ENCOUNTER — NON-APPOINTMENT (OUTPATIENT)
Age: 54
End: 2024-03-06

## 2024-03-11 ENCOUNTER — APPOINTMENT (OUTPATIENT)
Dept: UROLOGY | Facility: CLINIC | Age: 54
End: 2024-03-11

## 2024-03-19 ENCOUNTER — APPOINTMENT (OUTPATIENT)
Dept: UROLOGY | Facility: CLINIC | Age: 54
End: 2024-03-19
Payer: COMMERCIAL

## 2024-03-19 PROCEDURE — 99214 OFFICE O/P EST MOD 30 MIN: CPT

## 2024-03-19 PROCEDURE — G2211 COMPLEX E/M VISIT ADD ON: CPT

## 2024-03-19 PROCEDURE — 51741 ELECTRO-UROFLOWMETRY FIRST: CPT

## 2024-03-19 PROCEDURE — 51798 US URINE CAPACITY MEASURE: CPT

## 2024-03-19 NOTE — PHYSICAL EXAM
[General Appearance - Well Developed] : well developed [General Appearance - In No Acute Distress] : no acute distress [General Appearance - Well Nourished] : well nourished [Oriented To Time, Place, And Person] : oriented to person, place, and time

## 2024-03-21 LAB
ANION GAP SERPL CALC-SCNC: 14 MMOL/L
BUN SERPL-MCNC: 29 MG/DL
CALCIUM SERPL-MCNC: 10.1 MG/DL
CHLORIDE SERPL-SCNC: 98 MMOL/L
CO2 SERPL-SCNC: 24 MMOL/L
CREAT SERPL-MCNC: 1.51 MG/DL
EGFR: 55 ML/MIN/1.73M2
POTASSIUM SERPL-SCNC: 4.3 MMOL/L
SODIUM SERPL-SCNC: 136 MMOL/L

## 2024-03-24 LAB — PSA SERPL-MCNC: 0.24 NG/ML

## 2024-03-24 NOTE — ASSESSMENT
[FreeTextEntry1] : 53 year old male patient with: 1) recurrent UTIs; 2) BPH; 3) erectile dysfunction; 4) urethral strictures. I have reviewed US and obtained a PSA level today and have asked him to obtain an MRI and then to follow-up after to discuss further and make a decision whether it would be worthwhile to consider cystoscopy under anesthesia or any other procedures. Complex chronic care.   The submitted E/M billing level for this visit reflects the total time spent on the day of the visit including face-to-face time spent with the patient, non-face-to-face review of medical records and relevant information, documentation, and asynchronous communication with the patient after a visit via phone, email, or patients EHR portal after the visit. The medical records reviewed are either scanned into the chart or reviewed with the patient using a patients electronic medical records portal for patients with records not available to [facility] via electronic transmission platforms from other institutions and labs. Time spent counseling and performing coordination of care was also included in determining the appropriate EM billing level.   I have reviewed and verified information regarding the chief complaint and history recorded by the ancillary staff and/or the patient. I have independently reviewed and interpreted tests performed by other physicians and facilities as necessary.   I have discussed with the patient differential diagnosis, reason for auxiliary tests if ordered, risks, benefits, alternatives, and complications of each form of therapy were discussed.  I personally performed the services described in the documentation, reviewed the documentation recorded by the scribe in my presence, and it accurately and completely records my words and actions.

## 2024-03-24 NOTE — HISTORY OF PRESENT ILLNESS
[FreeTextEntry1] : 53 year old male patient seen in follow-up with a history of recurrent UTIs, BPH, and urethral stricture. He came in today and did a flowrate and had a PVR: 75 mL. Patient is overall doing well, with no recent UTIs. He did an ultrasound and we went over the results, which show a mildly enlarged prostate and 1.1 x1.4 cystic focus in the central prostate. It is unclear what this represents and whether it could be a source of infection or anything else.    I reviewed the following imaging studies and labwork:  Ultrasound of Kidney and Bladder 3/6/24:  No hydronephrosis or shadowing renal stone.  Mild prostatomegaly.  Questionable 1.1 x1.4 x 1.1 cm cystic focus in the central prostate, nonspecific BUN: 27 CRT: 1.25   Uroflow Results: Voiding time: 58.5s Time to peak flow: 6.5s Peak flow rate: 14.3 mL/s Average flow rate: 6.1 mL/s Voided volume: 356 mL PVR: 75 mL

## 2024-03-24 NOTE — ADDENDUM
[FreeTextEntry1] :  NICOLLE ELMORE, am scribing for and in the presence of  in the following sections: HISTORY OF PRESENT ILLNESS, PAST MEDICAL/FAMILY/SOCIAL HISTORY, REVIEW OF SYSTEMS, VITAL SIGNS, PHYSICAL EXAM, ASSESSMENT/PLAN on 03/19/2024.

## 2024-04-17 ENCOUNTER — NON-APPOINTMENT (OUTPATIENT)
Age: 54
End: 2024-04-17

## 2024-05-07 ENCOUNTER — APPOINTMENT (OUTPATIENT)
Dept: UROLOGY | Facility: CLINIC | Age: 54
End: 2024-05-07
Payer: COMMERCIAL

## 2024-05-07 DIAGNOSIS — R39.9 UNSPECIFIED SYMPTOMS AND SIGNS INVOLVING THE GENITOURINARY SYSTEM: ICD-10-CM

## 2024-05-07 DIAGNOSIS — N40.1 BENIGN PROSTATIC HYPERPLASIA WITH LOWER URINARY TRACT SYMPMS: ICD-10-CM

## 2024-05-07 DIAGNOSIS — N35.919 UNSPECIFIED URETHRAL STRICTURE, MALE, UNSPECIFIED SITE: ICD-10-CM

## 2024-05-07 DIAGNOSIS — N52.9 MALE ERECTILE DYSFUNCTION, UNSPECIFIED: ICD-10-CM

## 2024-05-07 DIAGNOSIS — N42.89 OTHER SPECIFIED DISORDERS OF PROSTATE: ICD-10-CM

## 2024-05-07 PROCEDURE — G2211 COMPLEX E/M VISIT ADD ON: CPT

## 2024-05-07 PROCEDURE — 81003 URINALYSIS AUTO W/O SCOPE: CPT | Mod: QW

## 2024-05-07 PROCEDURE — 99214 OFFICE O/P EST MOD 30 MIN: CPT | Mod: 25

## 2024-05-16 PROBLEM — N35.919 STRICTURE OF URETHRA: Status: ACTIVE | Noted: 2023-06-14

## 2024-05-16 PROBLEM — R39.9 LOWER URINARY TRACT SYMPTOMS (LUTS): Status: ACTIVE | Noted: 2021-01-29

## 2024-05-16 PROBLEM — N52.9 ED (ERECTILE DYSFUNCTION) OF ORGANIC ORIGIN: Status: ACTIVE | Noted: 2023-06-14

## 2024-05-16 PROBLEM — N40.1 BENIGN NON-NODULAR PROSTATIC HYPERPLASIA WITH LOWER URINARY TRACT SYMPTOMS: Status: ACTIVE | Noted: 2021-01-29

## 2024-05-16 PROBLEM — N42.89: Status: ACTIVE | Noted: 2024-03-19

## 2024-05-16 LAB
BACTERIA UR CULT: NORMAL
BILIRUB UR QL STRIP: NEGATIVE
COLLECTION METHOD: NORMAL
GLUCOSE UR-MCNC: NEGATIVE
HCG UR QL: 0.2 EU/DL
HGB UR QL STRIP.AUTO: NORMAL
KETONES UR-MCNC: NEGATIVE
LEUKOCYTE ESTERASE UR QL STRIP: NEGATIVE
NITRITE UR QL STRIP: NEGATIVE
PH UR STRIP: 5.5
PROT UR STRIP-MCNC: NORMAL
SP GR UR STRIP: 1.03

## 2024-05-16 NOTE — HISTORY OF PRESENT ILLNESS
[FreeTextEntry1] :  53 year old male patient seen in follow-up with a history of recurrent bacteriuria. There was a question on his renal and bladder ultrasound of cystic focus in the central prostate. He underwent an MRI, which does not show any such cystic focus. The MRI findings are listed below showing no abscesses were noted, only PIRADS 2 low suspicion lesion. Overall patient has been doing well. He has not had any recent infections. He will leave a urine sample today. His last urinalysis from January was clear.   MRI Prostate with and without IV contrast taken on 4/17/24:  prostate size of 4.3 cm x 2.7cm x 3.2 cm, overall volume 19cc minimal intravesical protrusion no MRI targetable lesions 4 x3mm left posterolateral midgland peripheral zone lesion

## 2024-05-16 NOTE — ADDENDUM
[FreeTextEntry1] :  NICOLLE ELMORE, am scribing for and in the presence of  in the following sections: HISTORY OF PRESENT ILLNESS, PAST MEDICAL/FAMILY/SOCIAL HISTORY, REVIEW OF SYSTEMS, VITAL SIGNS, PHYSICAL EXAM, ASSESSMENT/PLAN on 05/07/2024.

## 2024-05-16 NOTE — ASSESSMENT
[FreeTextEntry1] :  53 year old male patient with: 1) recurrent bacteriuria; 2) history of LUTS; 3) history of urethral stricture disease.  We discussed these issues at length. We will hold off on cystoscopy under anesthesia at present. If he has another infection, then we will arrange for a cystoscopy under anesthesia. Otherwise, he will follow-up as scheduled.  All of the patient's questions were otherwise answered. Total time=30 min.   The submitted E/M billing level for this visit reflects the total time spent on the day of the visit including face-to-face time spent with the patient, non-face-to-face review of medical records and relevant information, documentation, and asynchronous communication with the patient after a visit via phone, email, or patients EHR portal after the visit. The medical records reviewed are either scanned into the chart or reviewed with the patient using a patients electronic medical records portal for patients with records not available to [facility] via electronic transmission platforms from other institutions and labs. Time spent counseling and performing coordination of care was also included in determining the appropriate EM billing level.   I have reviewed and verified information regarding the chief complaint and history recorded by the ancillary staff and/or the patient. I have independently reviewed and interpreted tests performed by other physicians and facilities as necessary.   I have discussed with the patient differential diagnosis, reason for auxiliary tests if ordered, risks, benefits, alternatives, and complications of each form of therapy were discussed.  I personally performed the services described in the documentation, reviewed the documentation recorded by the scribe in my presence, and it accurately and completely records my words and actions.

## 2024-06-18 ENCOUNTER — APPOINTMENT (OUTPATIENT)
Dept: UROLOGY | Facility: CLINIC | Age: 54
End: 2024-06-18

## 2024-08-08 NOTE — DISCHARGE NOTE PROVIDER - NSDCQMCOGNITION_NEU_ALL_CORE
8/8/2024 Ms. 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 Her reflux is doing well. She is no longer taking aciphex or pepcid. Her BM were improved with pelvic floor therapy, miralax and dulcolax until her stress increased. Her 's dementia has gotten worse since moving. She is not able to relax. She is not able to go to the bathroom- she had some samples of ibserla and it helped.CS
No difficulties

## 2024-11-08 ENCOUNTER — APPOINTMENT (OUTPATIENT)
Dept: UROLOGY | Facility: CLINIC | Age: 54
End: 2024-11-08
Payer: COMMERCIAL

## 2024-11-08 DIAGNOSIS — N35.919 UNSPECIFIED URETHRAL STRICTURE, MALE, UNSPECIFIED SITE: ICD-10-CM

## 2024-11-08 DIAGNOSIS — N42.89 OTHER SPECIFIED DISORDERS OF PROSTATE: ICD-10-CM

## 2024-11-08 DIAGNOSIS — N52.9 MALE ERECTILE DYSFUNCTION, UNSPECIFIED: ICD-10-CM

## 2024-11-08 DIAGNOSIS — N40.1 BENIGN PROSTATIC HYPERPLASIA WITH LOWER URINARY TRACT SYMPMS: ICD-10-CM

## 2024-11-08 PROCEDURE — 99214 OFFICE O/P EST MOD 30 MIN: CPT

## 2024-11-08 PROCEDURE — 51798 US URINE CAPACITY MEASURE: CPT

## 2024-11-08 PROCEDURE — 51741 ELECTRO-UROFLOWMETRY FIRST: CPT

## 2024-11-08 PROCEDURE — G2211 COMPLEX E/M VISIT ADD ON: CPT | Mod: NC

## 2024-11-08 RX ORDER — TADALAFIL 10 MG/1
10 TABLET, FILM COATED ORAL
Qty: 10 | Refills: 1 | Status: ACTIVE | COMMUNITY
Start: 2024-11-08 | End: 1900-01-01

## 2025-01-07 ENCOUNTER — NON-APPOINTMENT (OUTPATIENT)
Age: 55
End: 2025-01-07

## 2025-01-09 ENCOUNTER — LABORATORY RESULT (OUTPATIENT)
Age: 55
End: 2025-01-09

## 2025-01-09 RX ORDER — AMOXICILLIN AND CLAVULANATE POTASSIUM 500; 125 MG/1; MG/1
500-125 TABLET, FILM COATED ORAL
Qty: 14 | Refills: 0 | Status: ACTIVE | COMMUNITY
Start: 2025-01-09 | End: 1900-01-01

## 2025-01-10 ENCOUNTER — APPOINTMENT (OUTPATIENT)
Dept: UROLOGY | Facility: CLINIC | Age: 55
End: 2025-01-10
Payer: COMMERCIAL

## 2025-01-10 DIAGNOSIS — N40.1 BENIGN PROSTATIC HYPERPLASIA WITH LOWER URINARY TRACT SYMPMS: ICD-10-CM

## 2025-01-10 DIAGNOSIS — N52.9 MALE ERECTILE DYSFUNCTION, UNSPECIFIED: ICD-10-CM

## 2025-01-10 DIAGNOSIS — R39.9 UNSPECIFIED SYMPTOMS AND SIGNS INVOLVING THE GENITOURINARY SYSTEM: ICD-10-CM

## 2025-01-10 DIAGNOSIS — N35.919 UNSPECIFIED URETHRAL STRICTURE, MALE, UNSPECIFIED SITE: ICD-10-CM

## 2025-01-10 DIAGNOSIS — N39.0 URINARY TRACT INFECTION, SITE NOT SPECIFIED: ICD-10-CM

## 2025-01-10 PROCEDURE — G2211 COMPLEX E/M VISIT ADD ON: CPT | Mod: NC

## 2025-01-10 PROCEDURE — 99214 OFFICE O/P EST MOD 30 MIN: CPT

## 2025-01-16 LAB
APPEARANCE: CLEAR
BILIRUBIN URINE: NEGATIVE
BLOOD URINE: ABNORMAL
COLOR: YELLOW
GLUCOSE QUALITATIVE U: NEGATIVE MG/DL
KETONES URINE: NEGATIVE MG/DL
LEUKOCYTE ESTERASE URINE: ABNORMAL
NITRITE URINE: NEGATIVE
PH URINE: 6
PROTEIN URINE: NEGATIVE MG/DL
SPECIFIC GRAVITY URINE: 1.01
UROBILINOGEN URINE: 0.2 MG/DL

## 2025-01-23 RX ORDER — CEFPODOXIME PROXETIL 200 MG/1
200 TABLET, FILM COATED ORAL
Qty: 14 | Refills: 0 | Status: ACTIVE | COMMUNITY
Start: 2025-01-13 | End: 1900-01-01

## 2025-01-27 RX ORDER — NITROFURANTOIN (MONOHYDRATE/MACROCRYSTALS) 25; 75 MG/1; MG/1
100 CAPSULE ORAL
Qty: 14 | Refills: 0 | Status: ACTIVE | COMMUNITY
Start: 2025-01-27 | End: 1900-01-01

## 2025-02-06 ENCOUNTER — NON-APPOINTMENT (OUTPATIENT)
Age: 55
End: 2025-02-06

## 2025-02-06 RX ORDER — SULFAMETHOXAZOLE AND TRIMETHOPRIM 800; 160 MG/1; MG/1
800-160 TABLET ORAL TWICE DAILY
Qty: 14 | Refills: 0 | Status: ACTIVE | COMMUNITY
Start: 2025-02-06 | End: 1900-01-01

## 2025-02-14 ENCOUNTER — NON-APPOINTMENT (OUTPATIENT)
Age: 55
End: 2025-02-14

## 2025-02-18 ENCOUNTER — APPOINTMENT (OUTPATIENT)
Dept: UROLOGY | Facility: CLINIC | Age: 55
End: 2025-02-18
Payer: COMMERCIAL

## 2025-02-18 DIAGNOSIS — N40.1 BENIGN PROSTATIC HYPERPLASIA WITH LOWER URINARY TRACT SYMPMS: ICD-10-CM

## 2025-02-18 DIAGNOSIS — N35.919 UNSPECIFIED URETHRAL STRICTURE, MALE, UNSPECIFIED SITE: ICD-10-CM

## 2025-02-18 DIAGNOSIS — N52.9 MALE ERECTILE DYSFUNCTION, UNSPECIFIED: ICD-10-CM

## 2025-02-18 PROCEDURE — 99214 OFFICE O/P EST MOD 30 MIN: CPT

## 2025-02-18 PROCEDURE — 51741 ELECTRO-UROFLOWMETRY FIRST: CPT

## 2025-02-18 PROCEDURE — G2211 COMPLEX E/M VISIT ADD ON: CPT | Mod: NC

## 2025-02-18 PROCEDURE — 51798 US URINE CAPACITY MEASURE: CPT

## 2025-02-18 RX ORDER — PREDNISONE 50 MG/1
50 TABLET ORAL
Qty: 3 | Refills: 0 | Status: ACTIVE | COMMUNITY
Start: 2025-02-18 | End: 1900-01-01

## 2025-02-18 RX ORDER — DIPHENHYDRAMINE HCL 50 MG/1
50 CAPSULE ORAL
Qty: 1 | Refills: 0 | Status: ACTIVE | COMMUNITY
Start: 2025-02-18 | End: 1900-01-01

## 2025-02-28 ENCOUNTER — NON-APPOINTMENT (OUTPATIENT)
Age: 55
End: 2025-02-28

## 2025-03-14 ENCOUNTER — NON-APPOINTMENT (OUTPATIENT)
Age: 55
End: 2025-03-14

## 2025-03-17 ENCOUNTER — NON-APPOINTMENT (OUTPATIENT)
Age: 55
End: 2025-03-17

## 2025-03-17 RX ORDER — AMOXICILLIN AND CLAVULANATE POTASSIUM 875; 125 MG/1; MG/1
875-125 TABLET, COATED ORAL
Qty: 14 | Refills: 0 | Status: ACTIVE | COMMUNITY
Start: 2025-03-17 | End: 1900-01-01

## 2025-03-25 ENCOUNTER — APPOINTMENT (OUTPATIENT)
Dept: UROLOGY | Facility: CLINIC | Age: 55
End: 2025-03-25
Payer: COMMERCIAL

## 2025-03-25 DIAGNOSIS — N35.919 UNSPECIFIED URETHRAL STRICTURE, MALE, UNSPECIFIED SITE: ICD-10-CM

## 2025-03-25 DIAGNOSIS — R39.9 UNSPECIFIED SYMPTOMS AND SIGNS INVOLVING THE GENITOURINARY SYSTEM: ICD-10-CM

## 2025-03-25 DIAGNOSIS — N52.9 MALE ERECTILE DYSFUNCTION, UNSPECIFIED: ICD-10-CM

## 2025-03-25 DIAGNOSIS — N40.1 BENIGN PROSTATIC HYPERPLASIA WITH LOWER URINARY TRACT SYMPMS: ICD-10-CM

## 2025-03-25 PROCEDURE — 99214 OFFICE O/P EST MOD 30 MIN: CPT

## 2025-03-25 PROCEDURE — G2211 COMPLEX E/M VISIT ADD ON: CPT | Mod: NC

## 2025-04-03 ENCOUNTER — NON-APPOINTMENT (OUTPATIENT)
Age: 55
End: 2025-04-03

## 2025-04-07 ENCOUNTER — NON-APPOINTMENT (OUTPATIENT)
Age: 55
End: 2025-04-07

## 2025-04-08 RX ORDER — AMOXICILLIN AND CLAVULANATE POTASSIUM 875; 125 MG/1; MG/1
875-125 TABLET, COATED ORAL
Qty: 14 | Refills: 0 | Status: ACTIVE | COMMUNITY
Start: 2025-04-08 | End: 1900-01-01

## 2025-05-09 ENCOUNTER — APPOINTMENT (OUTPATIENT)
Dept: UROLOGY | Facility: CLINIC | Age: 55
End: 2025-05-09

## 2025-05-13 RX ORDER — CEFPODOXIME PROXETIL 200 MG/1
200 TABLET, FILM COATED ORAL
Qty: 20 | Refills: 0 | Status: ACTIVE | COMMUNITY
Start: 2025-05-13 | End: 1900-01-01

## 2025-06-17 ENCOUNTER — APPOINTMENT (OUTPATIENT)
Dept: UROLOGY | Facility: CLINIC | Age: 55
End: 2025-06-17
Payer: COMMERCIAL

## 2025-06-17 PROCEDURE — G2211 COMPLEX E/M VISIT ADD ON: CPT | Mod: NC

## 2025-06-17 PROCEDURE — 99215 OFFICE O/P EST HI 40 MIN: CPT

## 2025-06-24 ENCOUNTER — OUTPATIENT (OUTPATIENT)
Dept: OUTPATIENT SERVICES | Facility: HOSPITAL | Age: 55
LOS: 1 days | Discharge: HOME | End: 2025-06-24
Payer: COMMERCIAL

## 2025-06-24 ENCOUNTER — TRANSCRIPTION ENCOUNTER (OUTPATIENT)
Age: 55
End: 2025-06-24

## 2025-06-24 DIAGNOSIS — Z98.890 OTHER SPECIFIED POSTPROCEDURAL STATES: Chronic | ICD-10-CM

## 2025-06-24 DIAGNOSIS — N41.0 ACUTE PROSTATITIS: ICD-10-CM

## 2025-06-24 PROCEDURE — C1751: CPT

## 2025-06-24 PROCEDURE — 36569 INSJ PICC 5 YR+ W/O IMAGING: CPT

## 2025-06-24 PROCEDURE — 76937 US GUIDE VASCULAR ACCESS: CPT

## 2025-06-24 PROCEDURE — 76937 US GUIDE VASCULAR ACCESS: CPT | Mod: 26,59

## 2025-06-24 PROCEDURE — 36410 VNPNXR 3YR/> PHY/QHP DX/THER: CPT | Mod: LT

## 2025-06-24 NOTE — ASU DISCHARGE PLAN (ADULT/PEDIATRIC) - FINANCIAL ASSISTANCE
[facility] provides services at a reduced cost to those who are determined to be eligible through [facility]’s financial assistance program. Information regarding [facility]’s financial assistance program can be found by going to [email or web address] or by calling [phone].

## 2025-06-24 NOTE — PRE PROCEDURE NOTE - PRE PROCEDURE EVALUATION
Vascular & Interventional Radiology Pre-Procedure Note    Procedure Name: image guided midline insertion    HPI: 55 year old male patient well known to me with a history of recurrent UTIs and history of possible stricture disease. He was recently treated by Dr. Pérez of infectious disease with what sounds like Augmentin. As soon as he went off the antibiotics, his infection recurred. He has a culture pending and may have PICC line placed for intravenous antibiotics. I think it probably makes sense to cystoscope him and get a biopsy if needed from bladder while he is on intravenous antibiotics, however he goes away on vacation in July so it is not clear if the timing will work. His flow is adequate and he could void again regarding his residual    Allergies: No Known Drug Allergies    Medications (Abx/Cardiac/Anticoagulation/Blood Products)    Exam  General: NAD, AAO x3, no distress  Chest: breathing comfortably on room air, CTAB    Radiology & Additional Studies: Radiology imaging reviewed.     Height/Weight: Daily     Daily     Consentable: [ X] Yes   [ ] No     Plan:   -55y Male presents for image guided midline insertion  -Risks/Benefits/alternatives explained with the patient and/or healthcare proxy and witnessed informed consent obtained.       Procedure Requested:     HPI:    PAST MEDICAL & SURGICAL HISTORY:  Hypertension    Diabetes mellitus    Chronc UTI    H/O inguinal hernia repair    H/O umbilical hernia repair    Allergies    shellfish (Anaphylaxis)  No Known Drug Allergies

## 2025-06-24 NOTE — ASU DISCHARGE PLAN (ADULT/PEDIATRIC) - NS MD DC FALL RISK RISK
For information on Fall & Injury Prevention, visit: [email or web address] OR  [email or web address] [place]  https://www.cdc.gov/steadi/patient.html

## 2025-06-25 ENCOUNTER — OUTPATIENT (OUTPATIENT)
Dept: OUTPATIENT SERVICES | Facility: HOSPITAL | Age: 55
LOS: 1 days | End: 2025-06-25
Payer: COMMERCIAL

## 2025-06-25 VITALS
OXYGEN SATURATION: 99 % | RESPIRATION RATE: 16 BRPM | TEMPERATURE: 98 F | HEIGHT: 69 IN | WEIGHT: 248.02 LBS | HEART RATE: 76 BPM | DIASTOLIC BLOOD PRESSURE: 79 MMHG | SYSTOLIC BLOOD PRESSURE: 117 MMHG

## 2025-06-25 DIAGNOSIS — Z98.890 OTHER SPECIFIED POSTPROCEDURAL STATES: Chronic | ICD-10-CM

## 2025-06-25 DIAGNOSIS — N30.20 OTHER CHRONIC CYSTITIS WITHOUT HEMATURIA: ICD-10-CM

## 2025-06-25 DIAGNOSIS — Z01.818 ENCOUNTER FOR OTHER PREPROCEDURAL EXAMINATION: ICD-10-CM

## 2025-06-25 LAB
APPEARANCE UR: CLEAR — SIGNIFICANT CHANGE UP
BILIRUB UR-MCNC: NEGATIVE — SIGNIFICANT CHANGE UP
COLOR SPEC: YELLOW — SIGNIFICANT CHANGE UP
DIFF PNL FLD: NEGATIVE — SIGNIFICANT CHANGE UP
GLUCOSE UR QL: NEGATIVE MG/DL — SIGNIFICANT CHANGE UP
KETONES UR QL: NEGATIVE MG/DL — SIGNIFICANT CHANGE UP
LEUKOCYTE ESTERASE UR-ACNC: NEGATIVE — SIGNIFICANT CHANGE UP
NITRITE UR-MCNC: NEGATIVE — SIGNIFICANT CHANGE UP
PH UR: 5.5 — SIGNIFICANT CHANGE UP (ref 5–8)
PROT UR-MCNC: NEGATIVE MG/DL — SIGNIFICANT CHANGE UP
SP GR SPEC: 1.02 — SIGNIFICANT CHANGE UP (ref 1–1.03)
UROBILINOGEN FLD QL: 0.2 MG/DL — SIGNIFICANT CHANGE UP (ref 0.2–1)

## 2025-06-25 PROCEDURE — 93005 ELECTROCARDIOGRAM TRACING: CPT

## 2025-06-25 PROCEDURE — 81003 URINALYSIS AUTO W/O SCOPE: CPT

## 2025-06-25 PROCEDURE — 99214 OFFICE O/P EST MOD 30 MIN: CPT | Mod: 25

## 2025-06-25 PROCEDURE — 87086 URINE CULTURE/COLONY COUNT: CPT

## 2025-06-25 PROCEDURE — 93010 ELECTROCARDIOGRAM REPORT: CPT

## 2025-06-25 NOTE — H&P PST ADULT - NSICDXPASTSURGICALHX_GEN_ALL_CORE_FT
PAST SURGICAL HISTORY:  H/O inguinal hernia repair     H/O umbilical hernia repair     History of cardiac cath

## 2025-06-25 NOTE — H&P PST ADULT - HISTORY OF PRESENT ILLNESS
Patient is a  55 year old  male presenting to PAST in preparation for  CYSTOSCOPY POSSIBLE BLADDER BIOPSY POSSIBLE URETHRAL DILATION  on 7-   under General anesthesia by Dr. Ubaldo Lyle  .    Per URO note on 6/17/25, "55 year old male patient well known to me with a history of recurrent UTIs and history of possible stricture disease. He was recently treated by Dr. Pérez of infectious disease with what sounds like Augmentin. As soon as he went off the antibiotics, his infection recurred. He has a culture pending and may have PICC line placed for intravenous antibiotics. I think it probably makes sense to cystoscope him and get a biopsy if needed from bladder while he is on intravenous antibiotics, however he goes away on vacation in July so it is not clear if the timing will work. His flow is adequate and he could void again regarding his residual."  Today reports burning upon urination due to UTI that he is which antibiotics for.      PATIENT  CURRENTLY DENIES CHEST PAIN  SHORTNESS OF BREATH  PALPITATIONS, OR UPPER RESPIRATORY INFECTION IN PAST 2 WEEKS    denies travel outside the [place] in the past 30 days      Anesthesia Alert  YES--Difficult Airway CLASS 4 AIRWAY  NO--History of neck surgery or radiation  YES--Limited ROM of neck  NO--History of Malignant hyperthermia  NO--No personal or family history of Pseudocholinesterase deficiency.  NO--Prior Anesthesia Complication  NO--Latex Allergy  NO--Loose teeth  NO--History of Rheumatoid Arthritis  YES--Bleeding risk  asa 81 mg kassie  NO--BRIANA  NO--Other_____    PT  DENIES ANY RASHES, ABRASION, OR OPEN WOUNDS OR CUTS    AS PER THE PATIENT, THIS IS HIS COMPLETE MEDICAL AND SURGICAL HX, INCLUDING MEDICATIONS PRESCRIBED AND OVER THE COUNTER    Patient  communicated understanding of instructions and was given the opportunity to ask questions and have them answered.    pt denies any suicidal ideation or thoughts, pt states not a threat to self or others    Revised Cardiac Risk Index for Pre-Operative Risk from [email or web address]  on 6/25/2025  ** All calculations should be rechecked by clinician prior to use **    RESULT SUMMARY:  0 points  RCRI Score    3.9 %  Risk of major cardiac event      INPUTS:  High-risk surgery —> 0 = No  History of ischemic heart disease —> 0 = No  History of congestive heart failure —> 0 = No  History of cerebrovascular disease —> 0 = No  Pre-operative treatment with insulin —> 0 = No  Pre-operative creatinine >2 mg/dL / 176.8 µmol/L —> 0 = No    D[facility] Activity Status Index (DASI) from [employer]  on 6/25/2025  ** All calculations should be rechecked by clinician prior to use **    RESULT SUMMARY:  58.2 points  The higher the score (maximum 58.2), the higher the functional status.    9.89 METs        INPUTS:  Take care of self —> 2.75 = Yes  Walk indoors —> 1.75 = Yes  Walk 1&ndash;2 blocks on level ground —> 2.75 = Yes  Climb a flight of stairs or walk up a hill —> 5.5 = Yes  Run a short distance —> 8 = Yes  Do light work around the house —> 2.7 = Yes  Do moderate work around the house —> 3.5 = Yes  Do heavy work around the house —> 8 = Yes  Do yardwork —> 4.5 = Yes  Have sexual relations —> 5.25 = Yes  Participate in moderate recreational activities —> 6 = Yes  Participate in strenuous sports —> 7.5 = Yes

## 2025-06-25 NOTE — H&P PST ADULT - NSICDXPASTMEDICALHX_GEN_ALL_CORE_FT
PAST MEDICAL HISTORY:  Chronic UTI     Diabetes mellitus     GERD (gastroesophageal reflux disease)     HLD (hyperlipidemia)     Hypertension

## 2025-06-25 NOTE — H&P PST ADULT - REASON FOR ADMISSION
Case Type: OP Block TimeSuite: [place] [facility] Surgery DateTime: 07- - 0:00PAST DateTime: 06- - 7:30Procedure: CYSTOSCOPY POSSIBLE BLADDER BIOPSY POSSIBLE URETHRAL DILATION  ERP?: NoLaterality: N/ALength of Procedure: 45 Minutes  Anesthesia Type: General

## 2025-06-26 DIAGNOSIS — Z01.818 ENCOUNTER FOR OTHER PREPROCEDURAL EXAMINATION: ICD-10-CM

## 2025-06-26 DIAGNOSIS — N30.20 OTHER CHRONIC CYSTITIS WITHOUT HEMATURIA: ICD-10-CM

## 2025-06-26 LAB
CULTURE RESULTS: SIGNIFICANT CHANGE UP
SPECIMEN SOURCE: SIGNIFICANT CHANGE UP

## 2025-07-09 ENCOUNTER — OUTPATIENT (OUTPATIENT)
Dept: OUTPATIENT SERVICES | Facility: HOSPITAL | Age: 55
LOS: 1 days | Discharge: HOME | End: 2025-07-09
Payer: COMMERCIAL

## 2025-07-09 ENCOUNTER — TRANSCRIPTION ENCOUNTER (OUTPATIENT)
Age: 55
End: 2025-07-09

## 2025-07-09 ENCOUNTER — APPOINTMENT (OUTPATIENT)
Dept: UROLOGY | Facility: HOSPITAL | Age: 55
End: 2025-07-09

## 2025-07-09 VITALS — HEART RATE: 67 BPM | DIASTOLIC BLOOD PRESSURE: 80 MMHG | SYSTOLIC BLOOD PRESSURE: 130 MMHG

## 2025-07-09 VITALS
RESPIRATION RATE: 18 BRPM | DIASTOLIC BLOOD PRESSURE: 77 MMHG | SYSTOLIC BLOOD PRESSURE: 120 MMHG | HEIGHT: 68 IN | TEMPERATURE: 99 F | OXYGEN SATURATION: 97 % | WEIGHT: 244.93 LBS | HEART RATE: 87 BPM

## 2025-07-09 DIAGNOSIS — Z98.890 OTHER SPECIFIED POSTPROCEDURAL STATES: Chronic | ICD-10-CM

## 2025-07-09 DIAGNOSIS — N30.20 OTHER CHRONIC CYSTITIS WITHOUT HEMATURIA: ICD-10-CM

## 2025-07-09 LAB — GLUCOSE BLDC GLUCOMTR-MCNC: 128 MG/DL — HIGH (ref 70–99)

## 2025-07-09 PROCEDURE — C1769: CPT

## 2025-07-09 PROCEDURE — 82962 GLUCOSE BLOOD TEST: CPT

## 2025-07-09 PROCEDURE — 52000 CYSTOURETHROSCOPY: CPT

## 2025-07-09 PROCEDURE — C9399: CPT

## 2025-07-09 RX ORDER — METOPROLOL SUCCINATE 50 MG/1
1 TABLET, EXTENDED RELEASE ORAL
Refills: 0 | DISCHARGE

## 2025-07-09 RX ORDER — PHENAZOPYRIDINE HCL 100 MG
200 TABLET ORAL ONCE
Refills: 0 | Status: COMPLETED | OUTPATIENT
Start: 2025-07-09 | End: 2025-07-09

## 2025-07-09 RX ORDER — PHENAZOPYRIDINE 200 MG/1
200 TABLET, FILM COATED ORAL
Qty: 6 | Refills: 3 | Status: ACTIVE | COMMUNITY
Start: 2025-07-09 | End: 1900-01-01

## 2025-07-09 RX ORDER — HYDROCHLOROTHIAZIDE 50 MG/1
0 TABLET ORAL
Refills: 0 | DISCHARGE

## 2025-07-09 RX ORDER — TIRZEPATIDE 7.5 MG/.5ML
10 INJECTION, SOLUTION SUBCUTANEOUS
Refills: 0 | DISCHARGE

## 2025-07-09 RX ORDER — AMLODIPINE BESYLATE 10 MG/1
1 TABLET ORAL
Refills: 0 | DISCHARGE

## 2025-07-09 RX ORDER — LISINOPRIL 5 MG/1
1 TABLET ORAL
Refills: 0 | DISCHARGE

## 2025-07-09 RX ORDER — ASPIRIN 325 MG
1 TABLET ORAL
Refills: 0 | DISCHARGE

## 2025-07-09 RX ORDER — EVOLOCUMAB 140 MG/ML
140 INJECTION, SOLUTION SUBCUTANEOUS
Refills: 0 | DISCHARGE

## 2025-07-09 RX ORDER — METFORMIN HYDROCHLORIDE 500 MG/1
1 TABLET ORAL
Refills: 0 | DISCHARGE

## 2025-07-09 RX ADMIN — Medication 200 MILLIGRAM(S): at 12:23

## 2025-07-09 NOTE — ASU DISCHARGE PLAN (ADULT/PEDIATRIC) - FINANCIAL ASSISTANCE
[facility] provides services at a reduced cost to those who are determined to be eligible through [facility]’s financial assistance program. Information regarding [facility]’s financial assistance program can be found by going to [email or web address] or by calling [phone].

## 2025-07-09 NOTE — PRE-ANESTHESIA EVALUATION ADULT - NSANTHOSAYNRD_GEN_A_CORE
No. BRIANA screening performed.  STOP BANG Legend: 0-2 = LOW Risk; 3-4 = INTERMEDIATE Risk; 5-8 = HIGH Risk
No. BRIANA screening performed.  STOP BANG Legend: 0-2 = LOW Risk; 3-4 = INTERMEDIATE Risk; 5-8 = HIGH Risk

## 2025-07-09 NOTE — PRE-ANESTHESIA EVALUATION ADULT - NSANTHPMHFT_GEN_ALL_CORE
held ozempic since 6/25  took 3 bp meds this am  \sugar 128 held ozempic since 6/25  took 3 bp meds this am  sugar 128    card cleared  non obstructive cad  echo normal ef  met > 4

## 2025-07-09 NOTE — BRIEF OPERATIVE NOTE - NSICDXBRIEFPROCEDURE_GEN_ALL_CORE_FT
PROCEDURES:  Rectal examination under anesthesia with cystoscopy 09-Jul-2025 11:10:33  Ubaldo Lyle

## 2025-07-10 ENCOUNTER — NON-APPOINTMENT (OUTPATIENT)
Age: 55
End: 2025-07-10

## 2025-07-10 PROBLEM — K21.9 GASTRO-ESOPHAGEAL REFLUX DISEASE WITHOUT ESOPHAGITIS: Chronic | Status: ACTIVE | Noted: 2025-06-25

## 2025-07-10 PROBLEM — E78.5 HYPERLIPIDEMIA, UNSPECIFIED: Chronic | Status: ACTIVE | Noted: 2025-06-25

## 2025-07-14 DIAGNOSIS — N40.0 BENIGN PROSTATIC HYPERPLASIA WITHOUT LOWER URINARY TRACT SYMPTOMS: ICD-10-CM

## 2025-07-14 DIAGNOSIS — E78.5 HYPERLIPIDEMIA, UNSPECIFIED: ICD-10-CM

## 2025-07-14 DIAGNOSIS — I10 ESSENTIAL (PRIMARY) HYPERTENSION: ICD-10-CM

## 2025-07-14 DIAGNOSIS — K21.9 GASTRO-ESOPHAGEAL REFLUX DISEASE WITHOUT ESOPHAGITIS: ICD-10-CM

## 2025-07-14 DIAGNOSIS — E11.9 TYPE 2 DIABETES MELLITUS WITHOUT COMPLICATIONS: ICD-10-CM

## 2025-07-15 ENCOUNTER — APPOINTMENT (OUTPATIENT)
Dept: UROLOGY | Facility: CLINIC | Age: 55
End: 2025-07-15

## 2025-08-05 ENCOUNTER — NON-APPOINTMENT (OUTPATIENT)
Age: 55
End: 2025-08-05

## 2025-08-05 ENCOUNTER — APPOINTMENT (OUTPATIENT)
Dept: UROLOGY | Facility: CLINIC | Age: 55
End: 2025-08-05

## 2025-08-05 DIAGNOSIS — N30.20 OTHER CHRONIC CYSTITIS W/OUT HEMATURIA: ICD-10-CM

## 2025-08-05 DIAGNOSIS — N52.9 MALE ERECTILE DYSFUNCTION, UNSPECIFIED: ICD-10-CM

## 2025-08-05 DIAGNOSIS — N40.1 BENIGN PROSTATIC HYPERPLASIA WITH LOWER URINARY TRACT SYMPMS: ICD-10-CM

## 2025-08-05 DIAGNOSIS — N35.919 UNSPECIFIED URETHRAL STRICTURE, MALE, UNSPECIFIED SITE: ICD-10-CM

## 2025-08-05 PROCEDURE — G2211 COMPLEX E/M VISIT ADD ON: CPT | Mod: NC

## 2025-08-05 PROCEDURE — 99214 OFFICE O/P EST MOD 30 MIN: CPT

## 2025-08-13 LAB — BACTERIA UR CULT: ABNORMAL

## 2025-08-30 ENCOUNTER — NON-APPOINTMENT (OUTPATIENT)
Age: 55
End: 2025-08-30

## 2025-09-02 ENCOUNTER — NON-APPOINTMENT (OUTPATIENT)
Age: 55
End: 2025-09-02